# Patient Record
Sex: FEMALE | Race: ASIAN | NOT HISPANIC OR LATINO | Employment: FULL TIME | URBAN - METROPOLITAN AREA
[De-identification: names, ages, dates, MRNs, and addresses within clinical notes are randomized per-mention and may not be internally consistent; named-entity substitution may affect disease eponyms.]

---

## 2017-12-28 ENCOUNTER — OFFICE VISIT (OUTPATIENT)
Dept: FAMILY MEDICINE | Facility: CLINIC | Age: 32
End: 2017-12-28
Payer: COMMERCIAL

## 2017-12-28 VITALS
HEART RATE: 73 BPM | WEIGHT: 129 LBS | DIASTOLIC BLOOD PRESSURE: 58 MMHG | SYSTOLIC BLOOD PRESSURE: 90 MMHG | TEMPERATURE: 97.8 F | BODY MASS INDEX: 23.74 KG/M2 | HEIGHT: 62 IN

## 2017-12-28 DIAGNOSIS — Z00.00 ROUTINE ADULT HEALTH MAINTENANCE: Primary | ICD-10-CM

## 2017-12-28 DIAGNOSIS — Z13.21 ENCOUNTER FOR VITAMIN DEFICIENCY SCREENING: ICD-10-CM

## 2017-12-28 PROCEDURE — 36415 COLL VENOUS BLD VENIPUNCTURE: CPT | Performed by: INTERNAL MEDICINE

## 2017-12-28 PROCEDURE — 82306 VITAMIN D 25 HYDROXY: CPT | Performed by: INTERNAL MEDICINE

## 2017-12-28 PROCEDURE — 99385 PREV VISIT NEW AGE 18-39: CPT | Performed by: INTERNAL MEDICINE

## 2017-12-28 NOTE — NURSING NOTE
"Chief Complaint   Patient presents with     Physical     fasting       Initial BP 90/58 (BP Location: Right arm, Patient Position: Chair, Cuff Size: Adult Regular)  Pulse 73  Temp 97.8  F (36.6  C) (Tympanic)  Ht 5' 1.5\" (1.562 m)  Wt 129 lb (58.5 kg)  LMP 12/09/2017  BMI 23.98 kg/m2 Estimated body mass index is 23.98 kg/(m^2) as calculated from the following:    Height as of this encounter: 5' 1.5\" (1.562 m).    Weight as of this encounter: 129 lb (58.5 kg).  Medication Reconciliation: complete  "

## 2017-12-28 NOTE — MR AVS SNAPSHOT
After Visit Summary   12/28/2017    Shayla Coronel    MRN: [id number]           Patient Information     Date Of Birth          1985        Visit Information        Provider Department      12/28/2017 10:00 AM Chyna Fry MD [facility]        Today's Diagnoses     Encounter for vitamin deficiency screening    -  1    Screening for malignant neoplasm of cervix        Need for prophylactic vaccination and inoculation against influenza        Need for prophylactic vaccination with tetanus-diphtheria (TD)          Care Instructions      Preventive Health Recommendations  Female Ages 26 - 39  Yearly exam:   See your health care provider every year in order to    Review health changes.     Discuss preventive care.      Review your medicines if you your doctor has prescribed any.    Until age 30: Get a Pap test every three years (more often if you have had an abnormal result).    After age 30: Talk to your doctor about whether you should have a Pap test every 3 years or have a Pap test with HPV screening every 5 years.   You do not need a Pap test if your uterus was removed (hysterectomy) and you have not had cancer.  You should be tested each year for STDs (sexually transmitted diseases), if you're at risk.   Talk to your provider about how often to have your cholesterol checked.  If you are at risk for diabetes, you should have a diabetes test (fasting glucose).  Shots: Get a flu shot each year. Get a tetanus shot every 10 years.   Nutrition:     Eat at least 5 servings of fruits and vegetables each day.    Eat whole-grain bread, whole-wheat pasta and brown rice instead of white grains and rice.    Talk to your provider about Calcium and Vitamin D.     Lifestyle    Exercise at least 150 minutes a week (30 minutes a day, 5 days of the week). This will help you control your weight and prevent disease.    Limit alcohol to one drink per day.    No smoking.     Wear  "sunscreen to prevent skin cancer.    See your dentist every six months for an exam and cleaning.            Follow-ups after your visit        Follow-up notes from your care team     Return for soon for pap smear.      Who to contact     If you have questions or need follow up information about today's clinic visit or your schedule please contact [facility] [place] directly at [phone].  Normal or non-critical lab and imaging results will be communicated to you by MyChart, letter or phone within 4 business days after the clinic has received the results. If you do not hear from us within 7 days, please contact the clinic through [employer] or phone. If you have a critical or abnormal lab result, we will notify you by phone as soon as possible.  Submit refill requests through [employer] or call your pharmacy and they will forward the refill request to us. Please allow 3 business days for your refill to be completed.          Additional Information About Your Visit        [employer] Information     [employer] lets you send messages to your doctor, view your test results, renew your prescriptions, schedule appointments and more. To sign up, go to [email or web address] . Click on \"Log in\" on the left side of the screen, which will take you to the Welcome page. Then click on \"Sign up Now\" on the right side of the page.     You will be asked to enter the access code listed below, as well as some personal information. Please follow the directions to create your username and password.     Your access code is: [email or web address]  Expires: 3/28/2018 10:32 AM     Your access code will  in 90 days. If you need help or a new code, please call your [facility] clinic or [phone].        Care EveryWhere ID     This is your Care EveryWhere ID. This could be used by other organizations to access your [facility] medical records  [id number]        Your Vitals Were     Pulse Temperature Height Last Period BMI (Body Mass Index)    " "   73 97.8  F (36.6  C) (Tympanic) 5' 1.5\" (1.562 m) 12/09/2017 23.98 kg/m2        Blood Pressure from Last 3 Encounters:   12/28/17 90/58    Weight from Last 3 Encounters:   12/28/17 129 lb (58.5 kg)              We Performed the Following     Vitamin D Deficiency        Primary Care Provider Fax #    Provider Not In System [phone]                Equal Access to Services     YOSELIN STEELE : Hadii aad ku hadasho Soomaali, waaxda luqadaha, qaybta kaalmada adeegyada, waxay idiin hayaan adeeg ramonsh lalauro . So [facility] [phone].    ATENCIÓN: Si habla esptomás, tiene a floyd disposición servicios gratuitos de asistencia lingüística. Llame al [phone].    We comply with applicable federal civil rights laws and [place] laws. We do not discriminate on the basis of race, color, national origin, age, disability, sex, sexual orientation, or gender identity.            Thank you!     Thank you for choosing [facility]  for your care. Our goal is always to provide you with excellent care. Hearing back from our patients is one way we can continue to improve our services. Please take a few minutes to complete the written survey that you may receive in the mail after your visit with us. Thank you!             Your Updated Medication List - Protect others around you: Learn how to safely use, store and throw away your medicines at www.disposemymeds.org.      Notice  As of 12/28/2017 10:32 AM    You have not been prescribed any medications.      "

## 2017-12-28 NOTE — LETTER
December 29, 2017      Shayla De Jesus  [address]  [place] [address]        Dear ,    We are writing to inform you of your test results.    Your vitamin D level is indeed low.  Please start taking 1000 international units supplement of vitamin D daily.  We can repeat this test in 4-6 months.    Resulted Orders   Vitamin D Deficiency   Result Value Ref Range    Vitamin D Deficiency screening 14 (L) 20 - 75 ug/L      Comment:      Season, race, dietary intake, and treatment affect the concentration of   25-hydroxy-Vitamin D. Values may decrease during winter months and increase   during summer months. Values 20-29 ug/L may indicate Vitamin D insufficiency   and values <20 ug/L may indicate Vitamin D deficiency.  Vitamin D determination is routinely performed by an immunoassay specific for   25 hydroxyvitamin D3.  If an individual is on vitamin D2 (ergocalciferol)   supplementation, please specify 25 OH vitamin D2 and D3 level determination by   LCMSMS test [id number].         If you have any questions or concerns, please call the clinic at the number listed above.       Sincerely,        Chyna Fry MD

## 2017-12-28 NOTE — PROGRESS NOTES
SUBJECTIVE:   CC: Shayla Coronel is an 32 year old woman who presents for preventive health visit.     Shayla lives with her 1 year old and . Works as .     Healthy Habits:    Do you get at least three servings of calcium containing foods daily (dairy, green leafy vegetables, etc.)? yes    Amount of exercise or daily activities, outside of work: 0 day(s) per week    Problems taking medications regularly No    Medication side effects: No    Have you had an eye exam in the past two years? no    Do you see a dentist twice per year? yes    Do you have sleep apnea, excessive snoring or daytime drowsiness?no          Today's PHQ-2 Score:   PHQ-2 (  [employer]) 2017   Q1: Little interest or pleasure in doing things 0   Q2: Feeling down, depressed or hopeless 1   PHQ-2 Score 1         Abuse: Current or Past(Physical, Sexual or Emotional)- NO  Do you feel safe in your environment - YES  Social History   Substance Use Topics     Smoking status: Never Smoker     Smokeless tobacco: Never Used     Alcohol use No     If you drink alcohol do you typically have >3 drinks per day or >7 drinks per week? No                     Reviewed orders with patient.  Reviewed health maintenance and updated orders accordingly - Yes  There is no problem list on file for this patient.    Past Surgical History:   Procedure Laterality Date      SECTION         Social History   Substance Use Topics     Smoking status: Never Smoker     Smokeless tobacco: Never Used     Alcohol use No     Family History   Problem Relation Age of Onset     DIABETES Father          No current outpatient prescriptions on file.         Mammogram not appropriate for this patient based on age.    Pertinent mammograms are reviewed under the imaging tab.  History of abnormal Pap smear: NO - age 30- 65 PAP every 3 years recommended    Reviewed and updated as needed this visit by clinical staff  Tobacco  Allergies  Meds  Med Hx   "Surg Hx  Fam Hx  Soc Hx        Reviewed and updated as needed this visit by Provider  Tobacco  Allergies  Meds  Med Hx  Surg Hx  Fam Hx  Soc Hx             ROS:  C: NEGATIVE for fever, chills, change in weight  I: NEGATIVE for worrisome rashes, moles or lesions  E: NEGATIVE for vision changes or irritation  ENT: NEGATIVE for ear, mouth and throat problems  R: NEGATIVE for significant cough or SOB  B: NEGATIVE for masses, tenderness or discharge  CV: NEGATIVE for chest pain, palpitations or peripheral edema  GI: NEGATIVE for nausea, abdominal pain, heartburn, or change in bowel habits  : NEGATIVE for unusual urinary or vaginal symptoms. Periods are regular.  M: some soreness in left wrist - baby sleeps on that arm at night. NEGATIVE for significant arthralgias or myalgia  N: NEGATIVE for weakness, dizziness or paresthesias  P: NEGATIVE for changes in mood or affect    OBJECTIVE:   BP 90/58 (BP Location: Right arm, Patient Position: Chair, Cuff Size: Adult Regular)  Pulse 73  Temp 97.8  F (36.6  C) (Tympanic)  Ht 5' 1.5\" (1.562 m)  Wt 129 lb (58.5 kg)  LMP 12/09/2017  BMI 23.98 kg/m2  EXAM:  GENERAL: healthy, alert and no distress  EYES: Eyes grossly normal to inspection, PERRL and conjunctivae and sclerae normal  HENT: ear canals and TM's normal, mouth without ulcers or lesions  NECK: no adenopathy, no asymmetry, masses, or scars and thyroid normal to palpation  RESP: lungs clear to auscultation - no rales, rhonchi or wheezes  CV: regular rate and rhythm, normal S1 S2, no S3 or S4, no murmur, click or rub, no peripheral edema and peripheral pulses strong  ABDOMEN: soft, nontender, and bowel sounds normal  MS: no gross musculoskeletal defects noted, no edema  SKIN: no suspicious lesions or rashes  NEURO: Normal strength and tone, mentation intact and speech normal  PSYCH: mentation appears normal, affect normal/bright    ASSESSMENT/PLAN:   1. Routine adult health maintenance  Healthy 32 year old woman. " "  Declined pap smear today, states she will make another appt to have that done   No need for routine labs     2. Encounter for vitamin deficiency screening  Would like to check vitamin D, some of her friends are deficient   - Vitamin D Deficiency    COUNSELING:   Reviewed preventive health counseling, as reflected in patient instructions       Regular exercise       Healthy diet/nutrition       Contraception - declines need for hormonal contraception        Osteoporosis Prevention/Bone Health       Regular dental care         reports that she has never smoked. She has never used smokeless tobacco.    Estimated body mass index is 23.98 kg/(m^2) as calculated from the following:    Height as of this encounter: 5' 1.5\" (1.562 m).    Weight as of this encounter: 129 lb (58.5 kg).         Counseling Resources:  ATP IV Guidelines  Pooled Cohorts Equation Calculator  Breast Cancer Risk Calculator  FRAX Risk Assessment  ICSI Preventive Guidelines  Dietary Guidelines for Americans, 2010  USDA's MyPlate  ASA Prophylaxis  Lung CA Screening    Chyna Fry MD  [facility]  "

## 2017-12-29 LAB — DEPRECATED CALCIDIOL+CALCIFEROL SERPL-MC: 14 UG/L (ref 20–75)

## 2018-11-18 LAB — HIV 1+2 AB+HIV1 P24 AG SERPL QL IA: NORMAL

## 2019-04-19 LAB
ABO + RH BLD: NORMAL
ABO + RH BLD: NORMAL
BLD GP AB SCN SERPL QL: NORMAL
HBV SURFACE AG SERPL QL IA: NORMAL
RUBELLA ABY IGG: NORMAL

## 2019-07-23 LAB
C TRACH DNA SPEC QL PROBE+SIG AMP: NORMAL
N GONORRHOEA DNA SPEC QL PROBE+SIG AMP: NORMAL

## 2019-08-09 LAB — TREPONEMA ANTIBODIES: NORMAL

## 2019-10-01 LAB — GROUP B STREP PCR: NORMAL

## 2019-10-04 ENCOUNTER — HOSPITAL ENCOUNTER (INPATIENT)
Facility: CLINIC | Age: 34
LOS: 3 days | Discharge: HOME HEALTH | End: 2019-10-07
Attending: OBSTETRICS & GYNECOLOGY | Admitting: OBSTETRICS & GYNECOLOGY
Payer: COMMERCIAL

## 2019-10-04 PROBLEM — Z98.891 HISTORY OF CESAREAN SECTION: Status: ACTIVE | Noted: 2019-10-04

## 2019-10-04 PROBLEM — Z36.89 ENCOUNTER FOR TRIAGE IN PREGNANT PATIENT: Status: ACTIVE | Noted: 2019-10-04

## 2019-10-04 LAB
ABO + RH BLD: NORMAL
ABO + RH BLD: NORMAL
BASOPHILS # BLD AUTO: 0 10E9/L (ref 0–0.2)
BASOPHILS NFR BLD AUTO: 0.3 %
BLD GP AB SCN SERPL QL: NORMAL
BLOOD BANK CMNT PATIENT-IMP: NORMAL
DIFFERENTIAL METHOD BLD: ABNORMAL
EOSINOPHIL # BLD AUTO: 0.1 10E9/L (ref 0–0.7)
EOSINOPHIL NFR BLD AUTO: 1.4 %
ERYTHROCYTE [DISTWIDTH] IN BLOOD BY AUTOMATED COUNT: 14.2 % (ref 10–15)
HCT VFR BLD AUTO: 33.3 % (ref 35–47)
HGB BLD-MCNC: 11.2 G/DL (ref 11.7–15.7)
IMM GRANULOCYTES # BLD: 0.1 10E9/L (ref 0–0.4)
IMM GRANULOCYTES NFR BLD: 0.6 %
LYMPHOCYTES # BLD AUTO: 1.6 10E9/L (ref 0.8–5.3)
LYMPHOCYTES NFR BLD AUTO: 20.9 %
MCH RBC QN AUTO: 31.8 PG (ref 26.5–33)
MCHC RBC AUTO-ENTMCNC: 33.6 G/DL (ref 31.5–36.5)
MCV RBC AUTO: 95 FL (ref 78–100)
MONOCYTES # BLD AUTO: 0.5 10E9/L (ref 0–1.3)
MONOCYTES NFR BLD AUTO: 6.2 %
NEUTROPHILS # BLD AUTO: 5.5 10E9/L (ref 1.6–8.3)
NEUTROPHILS NFR BLD AUTO: 70.6 %
NRBC # BLD AUTO: 0 10*3/UL
NRBC BLD AUTO-RTO: 0 /100
PLATELET # BLD AUTO: 136 10E9/L (ref 150–450)
RBC # BLD AUTO: 3.52 10E12/L (ref 3.8–5.2)
RUPTURE OF FETAL MEMBRANES BY ROM PLUS: POSITIVE
SPECIMEN EXP DATE BLD: NORMAL
T PALLIDUM AB SER QL: NONREACTIVE
WBC # BLD AUTO: 7.7 10E9/L (ref 4–11)

## 2019-10-04 PROCEDURE — 25000128 H RX IP 250 OP 636

## 2019-10-04 PROCEDURE — 86900 BLOOD TYPING SEROLOGIC ABO: CPT | Performed by: OBSTETRICS & GYNECOLOGY

## 2019-10-04 PROCEDURE — 12000000 ZZH R&B MED SURG/OB

## 2019-10-04 PROCEDURE — 36415 COLL VENOUS BLD VENIPUNCTURE: CPT | Performed by: OBSTETRICS & GYNECOLOGY

## 2019-10-04 PROCEDURE — 96365 THER/PROPH/DIAG IV INF INIT: CPT

## 2019-10-04 PROCEDURE — 25000125 ZZHC RX 250: Performed by: OBSTETRICS & GYNECOLOGY

## 2019-10-04 PROCEDURE — 86780 TREPONEMA PALLIDUM: CPT | Performed by: OBSTETRICS & GYNECOLOGY

## 2019-10-04 PROCEDURE — 84112 EVAL AMNIOTIC FLUID PROTEIN: CPT | Performed by: OBSTETRICS & GYNECOLOGY

## 2019-10-04 PROCEDURE — 25800030 ZZH RX IP 258 OP 636: Performed by: OBSTETRICS & GYNECOLOGY

## 2019-10-04 PROCEDURE — 96372 THER/PROPH/DIAG INJ SC/IM: CPT

## 2019-10-04 PROCEDURE — 85025 COMPLETE CBC W/AUTO DIFF WBC: CPT | Performed by: OBSTETRICS & GYNECOLOGY

## 2019-10-04 PROCEDURE — G0463 HOSPITAL OUTPT CLINIC VISIT: HCPCS

## 2019-10-04 PROCEDURE — 86901 BLOOD TYPING SEROLOGIC RH(D): CPT | Performed by: OBSTETRICS & GYNECOLOGY

## 2019-10-04 PROCEDURE — 86850 RBC ANTIBODY SCREEN: CPT | Performed by: OBSTETRICS & GYNECOLOGY

## 2019-10-04 RX ORDER — OXYTOCIN 10 [USP'U]/ML
10 INJECTION, SOLUTION INTRAMUSCULAR; INTRAVENOUS
Status: DISCONTINUED | OUTPATIENT
Start: 2019-10-04 | End: 2019-10-07 | Stop reason: HOSPADM

## 2019-10-04 RX ORDER — CEFAZOLIN SODIUM 2 G/100ML
2 INJECTION, SOLUTION INTRAVENOUS
Status: COMPLETED | OUTPATIENT
Start: 2019-10-04 | End: 2019-10-05

## 2019-10-04 RX ORDER — NALOXONE HYDROCHLORIDE 0.4 MG/ML
.1-.4 INJECTION, SOLUTION INTRAMUSCULAR; INTRAVENOUS; SUBCUTANEOUS
Status: DISCONTINUED | OUTPATIENT
Start: 2019-10-04 | End: 2019-10-07 | Stop reason: HOSPADM

## 2019-10-04 RX ORDER — FERROUS SULFATE 325(65) MG
325 TABLET ORAL
COMMUNITY
End: 2021-11-09

## 2019-10-04 RX ORDER — OXYCODONE AND ACETAMINOPHEN 5; 325 MG/1; MG/1
1 TABLET ORAL
Status: DISCONTINUED | OUTPATIENT
Start: 2019-10-04 | End: 2019-10-07 | Stop reason: HOSPADM

## 2019-10-04 RX ORDER — AZITHROMYCIN 500 MG/1
500 INJECTION, POWDER, LYOPHILIZED, FOR SOLUTION INTRAVENOUS
Status: COMPLETED | OUTPATIENT
Start: 2019-10-04 | End: 2019-10-05

## 2019-10-04 RX ORDER — BETAMETHASONE SODIUM PHOSPHATE AND BETAMETHASONE ACETATE 3; 3 MG/ML; MG/ML
INJECTION, SUSPENSION INTRA-ARTICULAR; INTRALESIONAL; INTRAMUSCULAR; SOFT TISSUE
Status: COMPLETED
Start: 2019-10-04 | End: 2019-10-04

## 2019-10-04 RX ORDER — PRENATAL VIT/IRON FUM/FOLIC AC 27MG-0.8MG
1 TABLET ORAL DAILY
COMMUNITY
End: 2021-11-09

## 2019-10-04 RX ORDER — METHYLERGONOVINE MALEATE 0.2 MG/ML
200 INJECTION INTRAVENOUS
Status: COMPLETED | OUTPATIENT
Start: 2019-10-04 | End: 2019-10-05

## 2019-10-04 RX ORDER — ACETAMINOPHEN 325 MG/1
650 TABLET ORAL EVERY 4 HOURS PRN
Status: DISCONTINUED | OUTPATIENT
Start: 2019-10-04 | End: 2019-10-07 | Stop reason: HOSPADM

## 2019-10-04 RX ORDER — IBUPROFEN 400 MG/1
800 TABLET, FILM COATED ORAL
Status: COMPLETED | OUTPATIENT
Start: 2019-10-04 | End: 2019-10-06

## 2019-10-04 RX ORDER — CEFAZOLIN SODIUM 1 G/3ML
1 INJECTION, POWDER, FOR SOLUTION INTRAMUSCULAR; INTRAVENOUS SEE ADMIN INSTRUCTIONS
Status: DISCONTINUED | OUTPATIENT
Start: 2019-10-04 | End: 2019-10-05 | Stop reason: HOSPADM

## 2019-10-04 RX ORDER — SODIUM CHLORIDE, SODIUM LACTATE, POTASSIUM CHLORIDE, CALCIUM CHLORIDE 600; 310; 30; 20 MG/100ML; MG/100ML; MG/100ML; MG/100ML
INJECTION, SOLUTION INTRAVENOUS CONTINUOUS
Status: DISCONTINUED | OUTPATIENT
Start: 2019-10-04 | End: 2019-10-07 | Stop reason: HOSPADM

## 2019-10-04 RX ORDER — CARBOPROST TROMETHAMINE 250 UG/ML
250 INJECTION, SOLUTION INTRAMUSCULAR
Status: DISCONTINUED | OUTPATIENT
Start: 2019-10-04 | End: 2019-10-07 | Stop reason: HOSPADM

## 2019-10-04 RX ORDER — OXYTOCIN/0.9 % SODIUM CHLORIDE 30/500 ML
100-340 PLASTIC BAG, INJECTION (ML) INTRAVENOUS CONTINUOUS PRN
Status: DISCONTINUED | OUTPATIENT
Start: 2019-10-04 | End: 2019-10-07 | Stop reason: HOSPADM

## 2019-10-04 RX ORDER — CITRIC ACID/SODIUM CITRATE 334-500MG
30 SOLUTION, ORAL ORAL
Status: COMPLETED | OUTPATIENT
Start: 2019-10-04 | End: 2019-10-05

## 2019-10-04 RX ORDER — BETAMETHASONE SODIUM PHOSPHATE AND BETAMETHASONE ACETATE 3; 3 MG/ML; MG/ML
12 INJECTION, SUSPENSION INTRA-ARTICULAR; INTRALESIONAL; INTRAMUSCULAR; SOFT TISSUE ONCE
Status: COMPLETED | OUTPATIENT
Start: 2019-10-04 | End: 2019-10-04

## 2019-10-04 RX ORDER — ONDANSETRON 2 MG/ML
4 INJECTION INTRAMUSCULAR; INTRAVENOUS EVERY 6 HOURS PRN
Status: DISCONTINUED | OUTPATIENT
Start: 2019-10-04 | End: 2019-10-07 | Stop reason: HOSPADM

## 2019-10-04 RX ORDER — SODIUM CHLORIDE, SODIUM LACTATE, POTASSIUM CHLORIDE, CALCIUM CHLORIDE 600; 310; 30; 20 MG/100ML; MG/100ML; MG/100ML; MG/100ML
INJECTION, SOLUTION INTRAVENOUS CONTINUOUS
Status: DISCONTINUED | OUTPATIENT
Start: 2019-10-05 | End: 2019-10-05 | Stop reason: HOSPADM

## 2019-10-04 RX ORDER — OXYTOCIN/0.9 % SODIUM CHLORIDE 30/500 ML
1-24 PLASTIC BAG, INJECTION (ML) INTRAVENOUS CONTINUOUS
Status: DISCONTINUED | OUTPATIENT
Start: 2019-10-04 | End: 2019-10-07 | Stop reason: HOSPADM

## 2019-10-04 RX ORDER — LIDOCAINE 40 MG/G
CREAM TOPICAL
Status: DISCONTINUED | OUTPATIENT
Start: 2019-10-04 | End: 2019-10-07 | Stop reason: HOSPADM

## 2019-10-04 RX ADMIN — SODIUM CHLORIDE, POTASSIUM CHLORIDE, SODIUM LACTATE AND CALCIUM CHLORIDE: 600; 310; 30; 20 INJECTION, SOLUTION INTRAVENOUS at 23:06

## 2019-10-04 RX ADMIN — SODIUM CHLORIDE, POTASSIUM CHLORIDE, SODIUM LACTATE AND CALCIUM CHLORIDE 125 ML/HR: 600; 310; 30; 20 INJECTION, SOLUTION INTRAVENOUS at 08:31

## 2019-10-04 RX ADMIN — Medication 2 MILLI-UNITS/MIN: at 14:49

## 2019-10-04 RX ADMIN — BETAMETHASONE SODIUM PHOSPHATE AND BETAMETHASONE ACETATE 12 MG: 3; 3 INJECTION, SUSPENSION INTRA-ARTICULAR; INTRALESIONAL; INTRAMUSCULAR; SOFT TISSUE at 08:32

## 2019-10-04 RX ADMIN — SODIUM CHLORIDE, POTASSIUM CHLORIDE, SODIUM LACTATE AND CALCIUM CHLORIDE: 600; 310; 30; 20 INJECTION, SOLUTION INTRAVENOUS at 15:12

## 2019-10-04 RX ADMIN — BETAMETHASONE SODIUM PHOSPHATE AND BETAMETHASONE ACETATE 12 MG: 3; 3 INJECTION, SUSPENSION INTRA-ARTICULAR; INTRALESIONAL; INTRAMUSCULAR at 08:32

## 2019-10-04 ASSESSMENT — ACTIVITIES OF DAILY LIVING (ADL)
TOILETING: 0-->INDEPENDENT
BATHING: 0-->INDEPENDENT
FALL_HISTORY_WITHIN_LAST_SIX_MONTHS: NO
SWALLOWING: 0-->SWALLOWS FOODS/LIQUIDS WITHOUT DIFFICULTY
RETIRED_EATING: 0-->INDEPENDENT
DRESS: 0-->INDEPENDENT
COGNITION: 0 - NO COGNITION ISSUES REPORTED
AMBULATION: 0-->INDEPENDENT
TRANSFERRING: 0-->INDEPENDENT
RETIRED_COMMUNICATION: 0-->UNDERSTANDS/COMMUNICATES WITHOUT DIFFICULTY

## 2019-10-04 ASSESSMENT — MIFFLIN-ST. JEOR: SCORE: 1340.92

## 2019-10-04 NOTE — PLAN OF CARE
Cervix unchanged 3/90/-2, vega every 2-6 minutes but patient states that she still only feels tightening with the contractions, some bloody show noted on pad.  Dr Goodwin updated via phone and orders received to start pitocin.  Discussed with patient and patient agrees to proceed, verbal consent given.

## 2019-10-04 NOTE — PLAN OF CARE
Patient called regarding another blood clot that she passed in the toilet, about the size of a golf ball.  Small amount of bright red blood also noted on pad.  Dr Goodwin notified and will continue to monitor, ok to continue on pitocin.

## 2019-10-04 NOTE — PROVIDER NOTIFICATION
Per Dr Goodwin, pt is GBS negative. ML at clinic to send hard copy of this result as not in her prenatal records.

## 2019-10-04 NOTE — PLAN OF CARE
Patient states she noticed a slight  increase of bleeding and one dime sized clot when she went up to the bathroom. The bleeding was still brown in color. She states she has has no increase of pain, just the mild contractions at this time. Advised patient to not flush the toilet next time in order to better assess the clots and bleeding if there is an increase. Will continue to monitor closely.

## 2019-10-04 NOTE — PLAN OF CARE
Pt transferred to room 215 for observation. Ambulated independently. Pt oriented to unit, room, and call light system. External toco and US applied with pt's verbal consent. VSS on RA. Denies feeling ctx. Scant amount of clear fluid and bright blood noted on the pad. Bedrest with bathroom privileges at this time. Plan to monitor ctx pattern, bleeding and FHT and notify provider with concerns.

## 2019-10-04 NOTE — PLAN OF CARE
Patient states she is comfortable with cramping. Contractions are about every 2-10 min apart. FHT baseline of 130, moderate variability, accelerations present, and no decelerations at this time. Dr. Goodwin at the bedside at 1115 to evaluate the patient. Patient states she has felt only a scant amount of clear fluid since admission and bleeding is now brown in color and only a small amount on her pad. SVE per Dr. Goodwin 3/90/-2. Plan per Dr. Goodwin is to continue to let her labor without pitocin and reassess cervical progress in 2-3 hours. She is able to have an epidural when she is ready for one. Will continue to monitor closely.

## 2019-10-04 NOTE — H&P
OB ADMISSION NOTE    CHIEF COMPLAINT:  Leaking fluid     Principal Problem:     premature rupture of membranes in third trimester  Active Problems:    Indication for care in labor or delivery    History of  section      OBSTETRICAL / DATING HISTORY:  Estimated Date of Delivery: Oct 29, 2019  Gestational Age:  36w3d    OB History    Para Term  AB Living   2 1 1 0 0 1   SAB TAB Ectopic Multiple Live Births   0 0 0 0 1      # Outcome Date GA Lbr [place]/2nd Weight Sex Delivery Anes PTL Lv   2 Current            1 Term 16    F -SEC   DYUEN      Complications: Face presentation of fetus           PAST MEDICAL HISTORY:  History reviewed. No pertinent past medical history.     PAST SURGICAL HISTORY:  Past Surgical History:   Procedure Laterality Date     APPENDECTOMY        SECTION          FAMILY HISTORY:  Family History   Problem Relation Age of Onset     Diabetes Father          ALLERGIES:   No Known Allergies     HABITS:  Social History     Socioeconomic History     Marital status:      Spouse name: Not on file     Number of children: Not on file     Years of education: Not on file     Highest education level: Not on file   Occupational History     Not on file   Social Needs     Financial resource strain: Not on file     Food insecurity:     Worry: Not on file     Inability: Not on file     Transportation needs:     Medical: Not on file     Non-medical: Not on file   Tobacco Use     Smoking status: Never Smoker     Smokeless tobacco: Never Used   Substance and Sexual Activity     Alcohol use: No     Drug use: No     Sexual activity: Yes     Partners: Male     Birth control/protection: None   Lifestyle     Physical activity:     Days per week: Not on file     Minutes per session: Not on file     Stress: Not on file   Relationships     Social connections:     Talks on phone: Not on file     Gets together: Not on file     Attends Anabaptist service: Not on file     Active  "member of club or organization: Not on file     Attends meetings of clubs or organizations: Not on file     Relationship status: Not on file     Intimate partner violence:     Fear of current or ex partner: Not on file     Emotionally abused: Not on file     Physically abused: Not on file     Forced sexual activity: Not on file   Other Topics Concern     Parent/sibling w/ CABG, MI or angioplasty before 65F 55M? Not Asked   Social History Narrative     Not on file        HISTORY OF PRESENT ILLNESS:    (Please see scanned  sheets for prenatal history. Examination at the time of admission revealed no interval change in the patient s history or physical exam except as described below.    34 year old  at 36w3d who presents after PPROM to clear fluid and then bright red. Bleeding has resolved and now just a little brown discharge. Feeling contractions, not very painful yet.  Reviewed history of prior  for face presentation/mentum posterior.      REVIEW OF SYSTEMS:  Negative except per HPI    PHYSICAL EXAM:   Patient Vitals for the past 8 hrs:   BP Temp Temp src Pulse Heart Rate Resp Height Weight   10/04/19 1000 -- 98.1  F (36.7  C) Temporal -- -- 16 -- --   10/04/19 0905 100/59 98  F (36.7  C) Temporal -- 75 16 -- --   10/04/19 0745 -- 99.3  F (37.4  C) Temporal -- -- -- -- --   10/04/19 0734 100/57 98.6  F (37  C) Temporal 82 -- 16 1.549 m (5' 1\") 69.9 kg (154 lb)     Gen: NAD, lying in bed  CV/Pulm: unlabored breathing  Abd: gravid, non-tender    SVE: 3/90/-2  Membrane Status: ruptured  Fetal Presentation: vertex  EFW: AGA    EFM & [place]: category 1, contractions irregular    Recent Labs   Lab Test 10/04/19  0840   HGB 11.2*   *         Assessment/Plan: 34 year old  at 36w3d here after PPROM in early labor  -admitted to L&D  -TOLAC: reviewed in detail with Dr. Sosa in office, no questions  PPROM, in early labor, plan recheck cervix in 2-3 hours, if no change low dose pitocin  S/p " betamethasone on admit  T&S done  -GBS negative  Dispo: postpartum    Anika Goodwin MD  10/4/2019   Pager: [phone]

## 2019-10-05 ENCOUNTER — ANESTHESIA (OUTPATIENT)
Dept: OBGYN | Facility: CLINIC | Age: 34
End: 2019-10-05
Payer: COMMERCIAL

## 2019-10-05 ENCOUNTER — ANESTHESIA EVENT (OUTPATIENT)
Dept: OBGYN | Facility: CLINIC | Age: 34
End: 2019-10-05
Payer: COMMERCIAL

## 2019-10-05 LAB
AMPHETAMINES UR QL SCN: NEGATIVE
CANNABINOIDS UR QL: NEGATIVE
COCAINE UR QL: NEGATIVE
OPIATES UR QL SCN: NEGATIVE
PCP UR QL SCN: NEGATIVE

## 2019-10-05 PROCEDURE — 12000035 ZZH R&B POSTPARTUM

## 2019-10-05 PROCEDURE — 25800030 ZZH RX IP 258 OP 636: Performed by: NURSE ANESTHETIST, CERTIFIED REGISTERED

## 2019-10-05 PROCEDURE — 36000058 ZZH SURGERY LEVEL 3 EA 15 ADDTL MIN: Performed by: OBSTETRICS & GYNECOLOGY

## 2019-10-05 PROCEDURE — 37000008 ZZH ANESTHESIA TECHNICAL FEE, 1ST 30 MIN: Performed by: OBSTETRICS & GYNECOLOGY

## 2019-10-05 PROCEDURE — 25000125 ZZHC RX 250: Performed by: NURSE ANESTHETIST, CERTIFIED REGISTERED

## 2019-10-05 PROCEDURE — 25800030 ZZH RX IP 258 OP 636: Performed by: OBSTETRICS & GYNECOLOGY

## 2019-10-05 PROCEDURE — 88307 TISSUE EXAM BY PATHOLOGIST: CPT | Performed by: OBSTETRICS & GYNECOLOGY

## 2019-10-05 PROCEDURE — 80307 DRUG TEST PRSMV CHEM ANLYZR: CPT | Performed by: OBSTETRICS & GYNECOLOGY

## 2019-10-05 PROCEDURE — 37000009 ZZH ANESTHESIA TECHNICAL FEE, EACH ADDTL 15 MIN: Performed by: OBSTETRICS & GYNECOLOGY

## 2019-10-05 PROCEDURE — 27210794 ZZH OR GENERAL SUPPLY STERILE: Performed by: OBSTETRICS & GYNECOLOGY

## 2019-10-05 PROCEDURE — 25000128 H RX IP 250 OP 636: Performed by: OBSTETRICS & GYNECOLOGY

## 2019-10-05 PROCEDURE — 25000125 ZZHC RX 250: Performed by: OBSTETRICS & GYNECOLOGY

## 2019-10-05 PROCEDURE — 36000056 ZZH SURGERY LEVEL 3 1ST 30 MIN: Performed by: OBSTETRICS & GYNECOLOGY

## 2019-10-05 PROCEDURE — 71000014 ZZH RECOVERY PHASE 1 LEVEL 2 FIRST HR: Performed by: OBSTETRICS & GYNECOLOGY

## 2019-10-05 PROCEDURE — 25000132 ZZH RX MED GY IP 250 OP 250 PS 637: Performed by: OBSTETRICS & GYNECOLOGY

## 2019-10-05 PROCEDURE — 25000128 H RX IP 250 OP 636: Performed by: NURSE ANESTHETIST, CERTIFIED REGISTERED

## 2019-10-05 PROCEDURE — 88307 TISSUE EXAM BY PATHOLOGIST: CPT | Mod: 26 | Performed by: OBSTETRICS & GYNECOLOGY

## 2019-10-05 RX ORDER — DEXTROSE, SODIUM CHLORIDE, SODIUM LACTATE, POTASSIUM CHLORIDE, AND CALCIUM CHLORIDE 5; .6; .31; .03; .02 G/100ML; G/100ML; G/100ML; G/100ML; G/100ML
INJECTION, SOLUTION INTRAVENOUS CONTINUOUS
Status: DISCONTINUED | OUTPATIENT
Start: 2019-10-05 | End: 2019-10-07 | Stop reason: HOSPADM

## 2019-10-05 RX ORDER — AMOXICILLIN 250 MG
1 CAPSULE ORAL 2 TIMES DAILY PRN
Status: DISCONTINUED | OUTPATIENT
Start: 2019-10-05 | End: 2019-10-07 | Stop reason: HOSPADM

## 2019-10-05 RX ORDER — HYDROMORPHONE HYDROCHLORIDE 1 MG/ML
.3-.5 INJECTION, SOLUTION INTRAMUSCULAR; INTRAVENOUS; SUBCUTANEOUS EVERY 30 MIN PRN
Status: DISCONTINUED | OUTPATIENT
Start: 2019-10-05 | End: 2019-10-07 | Stop reason: HOSPADM

## 2019-10-05 RX ORDER — AMOXICILLIN 250 MG
2 CAPSULE ORAL 2 TIMES DAILY PRN
Status: DISCONTINUED | OUTPATIENT
Start: 2019-10-05 | End: 2019-10-07 | Stop reason: HOSPADM

## 2019-10-05 RX ORDER — OXYCODONE HYDROCHLORIDE 5 MG/1
5-10 TABLET ORAL EVERY 4 HOURS PRN
Status: DISCONTINUED | OUTPATIENT
Start: 2019-10-05 | End: 2019-10-07 | Stop reason: HOSPADM

## 2019-10-05 RX ORDER — SCOLOPAMINE TRANSDERMAL SYSTEM 1 MG/1
1 PATCH, EXTENDED RELEASE TRANSDERMAL ONCE
Status: DISCONTINUED | OUTPATIENT
Start: 2019-10-05 | End: 2019-10-07 | Stop reason: HOSPADM

## 2019-10-05 RX ORDER — NALBUPHINE HYDROCHLORIDE 10 MG/ML
2.5-5 INJECTION, SOLUTION INTRAMUSCULAR; INTRAVENOUS; SUBCUTANEOUS EVERY 6 HOURS PRN
Status: DISCONTINUED | OUTPATIENT
Start: 2019-10-05 | End: 2019-10-07 | Stop reason: HOSPADM

## 2019-10-05 RX ORDER — ONDANSETRON 2 MG/ML
4 INJECTION INTRAMUSCULAR; INTRAVENOUS EVERY 6 HOURS PRN
Status: DISCONTINUED | OUTPATIENT
Start: 2019-10-05 | End: 2019-10-07 | Stop reason: HOSPADM

## 2019-10-05 RX ORDER — HYDROCORTISONE 2.5 %
CREAM (GRAM) TOPICAL 3 TIMES DAILY PRN
Status: DISCONTINUED | OUTPATIENT
Start: 2019-10-05 | End: 2019-10-07 | Stop reason: HOSPADM

## 2019-10-05 RX ORDER — BUPIVACAINE HYDROCHLORIDE 7.5 MG/ML
INJECTION, SOLUTION INTRASPINAL PRN
Status: DISCONTINUED | OUTPATIENT
Start: 2019-10-05 | End: 2019-10-05

## 2019-10-05 RX ORDER — OXYTOCIN/0.9 % SODIUM CHLORIDE 30/500 ML
PLASTIC BAG, INJECTION (ML) INTRAVENOUS CONTINUOUS PRN
Status: DISCONTINUED | OUTPATIENT
Start: 2019-10-05 | End: 2019-10-05

## 2019-10-05 RX ORDER — ONDANSETRON 2 MG/ML
INJECTION INTRAMUSCULAR; INTRAVENOUS PRN
Status: DISCONTINUED | OUTPATIENT
Start: 2019-10-05 | End: 2019-10-05

## 2019-10-05 RX ORDER — OXYTOCIN/0.9 % SODIUM CHLORIDE 30/500 ML
100 PLASTIC BAG, INJECTION (ML) INTRAVENOUS CONTINUOUS
Status: DISCONTINUED | OUTPATIENT
Start: 2019-10-05 | End: 2019-10-07 | Stop reason: HOSPADM

## 2019-10-05 RX ORDER — NALOXONE HYDROCHLORIDE 0.4 MG/ML
.1-.4 INJECTION, SOLUTION INTRAMUSCULAR; INTRAVENOUS; SUBCUTANEOUS
Status: DISCONTINUED | OUTPATIENT
Start: 2019-10-05 | End: 2019-10-07 | Stop reason: HOSPADM

## 2019-10-05 RX ORDER — IBUPROFEN 600 MG/1
600 TABLET, FILM COATED ORAL EVERY 6 HOURS PRN
Status: DISCONTINUED | OUTPATIENT
Start: 2019-10-05 | End: 2019-10-07 | Stop reason: HOSPADM

## 2019-10-05 RX ORDER — KETOROLAC TROMETHAMINE 30 MG/ML
30 INJECTION, SOLUTION INTRAMUSCULAR; INTRAVENOUS EVERY 6 HOURS
Status: COMPLETED | OUTPATIENT
Start: 2019-10-05 | End: 2019-10-05

## 2019-10-05 RX ORDER — MORPHINE SULFATE 1 MG/ML
INJECTION, SOLUTION EPIDURAL; INTRATHECAL; INTRAVENOUS PRN
Status: DISCONTINUED | OUTPATIENT
Start: 2019-10-05 | End: 2019-10-05

## 2019-10-05 RX ORDER — MORPHINE SULFATE 1 MG/ML
INJECTION, SOLUTION EPIDURAL; INTRATHECAL; INTRAVENOUS
Status: COMPLETED
Start: 2019-10-05 | End: 2019-10-05

## 2019-10-05 RX ORDER — ACETAMINOPHEN 325 MG/1
975 TABLET ORAL EVERY 6 HOURS PRN
Status: DISCONTINUED | OUTPATIENT
Start: 2019-10-05 | End: 2019-10-07 | Stop reason: HOSPADM

## 2019-10-05 RX ORDER — OXYTOCIN 10 [USP'U]/ML
10 INJECTION, SOLUTION INTRAMUSCULAR; INTRAVENOUS
Status: DISCONTINUED | OUTPATIENT
Start: 2019-10-05 | End: 2019-10-07 | Stop reason: HOSPADM

## 2019-10-05 RX ORDER — ONDANSETRON 2 MG/ML
4 INJECTION INTRAMUSCULAR; INTRAVENOUS EVERY 4 HOURS PRN
Status: DISCONTINUED | OUTPATIENT
Start: 2019-10-05 | End: 2019-10-07 | Stop reason: HOSPADM

## 2019-10-05 RX ORDER — SIMETHICONE 80 MG
80 TABLET,CHEWABLE ORAL 4 TIMES DAILY PRN
Status: DISCONTINUED | OUTPATIENT
Start: 2019-10-05 | End: 2019-10-07 | Stop reason: HOSPADM

## 2019-10-05 RX ORDER — LIDOCAINE 40 MG/G
CREAM TOPICAL
Status: DISCONTINUED | OUTPATIENT
Start: 2019-10-05 | End: 2019-10-07 | Stop reason: HOSPADM

## 2019-10-05 RX ORDER — LANOLIN 100 %
OINTMENT (GRAM) TOPICAL
Status: DISCONTINUED | OUTPATIENT
Start: 2019-10-05 | End: 2019-10-07 | Stop reason: HOSPADM

## 2019-10-05 RX ORDER — BISACODYL 10 MG
10 SUPPOSITORY, RECTAL RECTAL DAILY PRN
Status: DISCONTINUED | OUTPATIENT
Start: 2019-10-07 | End: 2019-10-07 | Stop reason: HOSPADM

## 2019-10-05 RX ORDER — OXYTOCIN/0.9 % SODIUM CHLORIDE 30/500 ML
340 PLASTIC BAG, INJECTION (ML) INTRAVENOUS CONTINUOUS PRN
Status: DISCONTINUED | OUTPATIENT
Start: 2019-10-05 | End: 2019-10-07 | Stop reason: HOSPADM

## 2019-10-05 RX ADMIN — Medication 100 ML/HR: at 05:00

## 2019-10-05 RX ADMIN — ACETAMINOPHEN 975 MG: 325 TABLET, FILM COATED ORAL at 21:04

## 2019-10-05 RX ADMIN — KETOROLAC TROMETHAMINE 30 MG: 30 INJECTION, SOLUTION INTRAMUSCULAR at 05:01

## 2019-10-05 RX ADMIN — SODIUM CITRATE AND CITRIC ACID MONOHYDRATE 30 ML: 500; 334 SOLUTION ORAL at 00:29

## 2019-10-05 RX ADMIN — SENNOSIDES AND DOCUSATE SODIUM 1 TABLET: 8.6; 5 TABLET ORAL at 21:04

## 2019-10-05 RX ADMIN — AZITHROMYCIN MONOHYDRATE 500 MG: 500 INJECTION, POWDER, LYOPHILIZED, FOR SOLUTION INTRAVENOUS at 00:45

## 2019-10-05 RX ADMIN — ACETAMINOPHEN 975 MG: 325 TABLET, FILM COATED ORAL at 05:02

## 2019-10-05 RX ADMIN — KETOROLAC TROMETHAMINE 30 MG: 30 INJECTION, SOLUTION INTRAMUSCULAR at 22:59

## 2019-10-05 RX ADMIN — SENNOSIDES AND DOCUSATE SODIUM 2 TABLET: 8.6; 5 TABLET ORAL at 08:06

## 2019-10-05 RX ADMIN — MORPHINE SULFATE 0.15 MG: 1 INJECTION, SOLUTION EPIDURAL; INTRATHECAL; INTRAVENOUS at 00:40

## 2019-10-05 RX ADMIN — KETOROLAC TROMETHAMINE 30 MG: 30 INJECTION, SOLUTION INTRAMUSCULAR at 11:02

## 2019-10-05 RX ADMIN — ACETAMINOPHEN 975 MG: 325 TABLET, FILM COATED ORAL at 13:17

## 2019-10-05 RX ADMIN — CEFAZOLIN SODIUM 2 G: 2 INJECTION, SOLUTION INTRAVENOUS at 00:40

## 2019-10-05 RX ADMIN — BUPIVACAINE HYDROCHLORIDE IN DEXTROSE 1.6 MG: 7.5 INJECTION, SOLUTION SUBARACHNOID at 00:40

## 2019-10-05 RX ADMIN — Medication 340 ML/HR: at 00:58

## 2019-10-05 RX ADMIN — SODIUM CHLORIDE, POTASSIUM CHLORIDE, SODIUM LACTATE AND CALCIUM CHLORIDE: 600; 310; 30; 20 INJECTION, SOLUTION INTRAVENOUS at 03:44

## 2019-10-05 RX ADMIN — METHYLERGONOVINE MALEATE 200 MCG: 0.2 INJECTION INTRAMUSCULAR; INTRAVENOUS at 01:08

## 2019-10-05 RX ADMIN — PHENYLEPHRINE HYDROCHLORIDE 0.5 MCG/KG/MIN: 10 INJECTION INTRAVENOUS at 00:41

## 2019-10-05 RX ADMIN — SODIUM CHLORIDE, SODIUM LACTATE, POTASSIUM CHLORIDE, CALCIUM CHLORIDE AND DEXTROSE MONOHYDRATE: 5; 600; 310; 30; 20 INJECTION, SOLUTION INTRAVENOUS at 11:06

## 2019-10-05 RX ADMIN — KETOROLAC TROMETHAMINE 30 MG: 30 INJECTION, SOLUTION INTRAMUSCULAR at 17:44

## 2019-10-05 RX ADMIN — ONDANSETRON 4 MG: 2 INJECTION INTRAMUSCULAR; INTRAVENOUS at 01:02

## 2019-10-05 RX ADMIN — SODIUM CHLORIDE, POTASSIUM CHLORIDE, SODIUM LACTATE AND CALCIUM CHLORIDE: 600; 310; 30; 20 INJECTION, SOLUTION INTRAVENOUS at 00:34

## 2019-10-05 NOTE — ANESTHESIA PREPROCEDURE EVALUATION
"Anesthesia Pre-Procedure Evaluation    Patient: Shayla De Jesus   MRN: [id number] : 1985          Preoperative Diagnosis: * No pre-op diagnosis entered *    Procedure(s):   SECTION    History reviewed. No pertinent past medical history.  Past Surgical History:   Procedure Laterality Date     APPENDECTOMY        SECTION         Anesthesia Evaluation     .             ROS/MED HX    ENT/Pulmonary:       Neurologic:       Cardiovascular:         METS/Exercise Tolerance:     Hematologic:         Musculoskeletal:         GI/Hepatic:         Renal/Genitourinary:         Endo:         Psychiatric:         Infectious Disease:         Malignancy:         Other:    (+) Possibly pregnant                         Physical Exam  Normal systems: dental    Airway   Mallampati: III  TM distance: >3 FB  Neck ROM: full    Dental     Cardiovascular   Rhythm and rate: regular      Pulmonary    breath sounds clear to auscultation            Lab Results   Component Value Date    WBC 7.7 10/04/2019    HGB 11.2 (L) 10/04/2019    HCT 33.3 (L) 10/04/2019     (L) 10/04/2019       Preop Vitals  BP Readings from Last 3 Encounters:   10/04/19 110/53   17 90/58    Pulse Readings from Last 3 Encounters:   10/04/19 82   17 73      Resp Readings from Last 3 Encounters:   10/04/19 16    SpO2 Readings from Last 3 Encounters:   No data found for SpO2      Temp Readings from Last 1 Encounters:   10/04/19 36.7  C (98.1  F) (Temporal)    Ht Readings from Last 1 Encounters:   10/04/19 1.549 m (5' 1\")      Wt Readings from Last 1 Encounters:   10/04/19 69.9 kg (154 lb)    Estimated body mass index is 29.1 kg/m  as calculated from the following:    Height as of this encounter: 1.549 m (5' 1\").    Weight as of this encounter: 69.9 kg (154 lb).       Anesthesia Plan      History & Physical Review  History and physical reviewed and following examination; no interval change.    ASA Status:  2 emergent.        Plan for " Spinal   PONV prophylaxis:  Ondansetron (or other 5HT-3)       Postoperative Care      Consents  Anesthetic plan, risks, benefits and alternatives discussed with:  Patient..                 Padmini Cosby

## 2019-10-05 NOTE — PROGRESS NOTES
[facility]   Obstetrics Post-Op / Progress Note         Assessment and Plan:    Assessment:   POD 0  Low transverse repeat  section  Failed TOLAC  L&D complications: Failed TOLAC      Doing well.  Clean wound without signs of infection.  Normal healing wound.  No immediate surgical complications identified.  No excessive bleeding  Pain well-controlled.      Plan:   Ambulation encouraged  Anticipate discharge in 3 days  ADAT            Interval History:   Doing well.  Pain is well-controlled.  No fevers.  No history of wound drainage, warmth or significant erythema.  Good appetite.  Denies chest pain, shortness of breath, nausea or vomiting.  Ambulatory.            Significant Problems:    History reviewed. No pertinent past medical history.          Review of Systems:    The patient denies any chest pain, shortness of breath, excessive pain, fever, chills, purulent drainage from the wound, nausea or vomiting.          Medications:   All medications related to the patient's surgery have been reviewed          Physical Exam:     All vitals stable  Patient Vitals for the past 8 hrs:   BP Temp Temp src Pulse Heart Rate Resp SpO2   10/05/19 0900 104/64 -- -- -- 56 16 100 %   10/05/19 0845 90/53 97.6  F (36.4  C) Oral -- 56 16 --   10/05/19 0659 95/57 -- -- -- 63 16 100 %   10/05/19 0559 (!) 88/52 -- -- -- 76 16 100 %   10/05/19 0500 97/58 -- -- -- 83 16 100 %   10/05/19 0430 90/48 98  F (36.7  C) Oral -- 81 16 100 %   10/05/19 0400 92/65 -- -- 80 79 13 95 %   10/05/19 0345 (!) 105/24 -- -- 98 72 13 99 %   10/05/19 0330 92/52 -- -- 71 77 17 96 %   10/05/19 0329 -- -- -- -- -- -- 95 %   10/05/19 0315 92/56 -- -- 76 76 23 97 %   10/05/19 0300 98/61 -- -- 69 72 13 --   10/05/19 0245 94/53 -- -- 74 73 14 96 %   10/05/19 0230 96/61 -- -- 72 75 13 96 %   10/05/19 0215 93/60 -- -- -- 78 15 95 %   10/05/19 0214 -- -- -- -- 75 18 94 %   10/05/19 0210 107/45 -- -- -- 74 16 --   10/05/19 0200 95/73 -- -- --  81 -- --     Wound clean and dry with minimal or no drainage.  Surrounding skin with minimal erythema.  Ext NT           Data:     All laboratory data related to this surgery reviewed  Hemoglobin   Date Value Ref Range Status   10/04/2019 11.2 (L) 11.7 - 15.7 g/dL Final     No imaging studies have been ordered    Crystal Dent MD

## 2019-10-05 NOTE — PROGRESS NOTES
Pt transferred in stable condition via bed with all belongings, [facility] to [facility].  SO accompanied pt to bedside. IV infusing. Report given to EJFF Pendleton.

## 2019-10-05 NOTE — PLAN OF CARE
VSS. Fundus firm, scant flow. Incision dressing CDI. Able to stand at the side of bed, xavier care completed. Ryan catheter draining clear urine. Urine tox neg. Breastfeeding infant every 3 hours, infant sleepy at breast. Pumping after breastfeeding. Supplementing infant with HDM/EMB. Rating pain a 2-3/10. Taking tylenol and ibuprofen for pain. Encouraged to call with questions and concerns. Will continue to monitor.

## 2019-10-05 NOTE — ANESTHESIA CARE TRANSFER NOTE
Patient: Shayla De Jesus    Procedure(s):   SECTION    Diagnosis: * No pre-op diagnosis entered *  Diagnosis Additional Information: No value filed.    Anesthesia Type:   Spinal     Note:  Airway :Room Air  Patient transferred to:Labor and Delivery  Comments:   Transferred to PACU RN. Patient awake and verbal. Spontaneous resp and on room air. Monitors and alarms on. VSS. Report given.      Vitals: (Last set prior to Anesthesia Care Transfer)    CRNA VITALS  10/5/2019 0102 - 10/5/2019 0139      10/5/2019             NIBP:  108/71    NIBP Mean:  86    Resp Rate (set):  10                Electronically Signed By: CARLOS Abbott CRNA  2019  1:39 AM

## 2019-10-05 NOTE — PLAN OF CARE
Pt brought to floor by RN with infant in her arms and  by her side. Report received in room 414. Pt orientated to room. Infant safety and bulb syringe addressed. Bands checked. All questions answered and encouraged. VSS. Fundus firm, down 1. Light flow. Golf ball size clot at first half an hour check. No further bleeding. No clots at seconded half an hour check. Denying any pain, given tylenol and toradol. Incision dressing CDI. Lungs clear. Hypoactive bowl sounds, tolerating clear liquids. Attempting to breastfeed infant every 3 hours with nipple shield. Will continue to monitor.

## 2019-10-05 NOTE — ANESTHESIA PROCEDURE NOTES
Peripheral nerve/Neuraxial procedure note : intrathecal  Pre-Procedure  Performed by Padmini Cosby  Location: OR      Pre-Anesthestic Checklist: patient identified, IV checked, site marked, risks and benefits discussed, informed consent, monitors and equipment checked, pre-op evaluation and at physician/surgeon's request    Timeout  Correct Patient: Yes   Correct Procedure: Yes   Correct Site: Yes   Correct Laterality: N/A   Correct Position: Yes   Site Marked: N/A   .   Procedure Documentation    .    Procedure: intrathecal, .   Patient Position:sitting Insertion Site:L4-5  (midline approach)     Patient Prep/Sterile Barriers; povidone-iodine 7.5% surgical scrub.  .  Needle:  Spinal Needle (gauge): 25  Spinal/LP Needle Length (inches): 3.5 # of attempts: 1 and # of redirects:  Introducer used .        Assessment/Narrative  Paresthesias: No.  .  .  clear CSF fluid removed .

## 2019-10-05 NOTE — PLAN OF CARE
PT progressing with plan of care.  Recovering from procedure, denies pain, vital signs WDL, adequate output.

## 2019-10-05 NOTE — ANESTHESIA POSTPROCEDURE EVALUATION
Patient: Shayla De Jesus    Procedure(s):   SECTION    Diagnosis:* No pre-op diagnosis entered *  Diagnosis Additional Information: No value filed.    Anesthesia Type:  Spinal    Note:  Anesthesia Post Evaluation    Patient location during evaluation: PACU  Patient participation: Able to participate in evaluation but full recovery from regional anesthesia has not yet ocurrred but is anticipated to occur within 48 hours  Level of consciousness: awake  Pain management: adequate  Airway patency: patent  Cardiovascular status: acceptable  Respiratory status: acceptable  Hydration status: acceptable  PONV: none     Anesthetic complications: None          Last vitals:  Vitals:    10/05/19 0430 10/05/19 0500 10/05/19 0559   BP: 90/48 97/58 (!) (P) 88/52   Pulse:      Resp: 16 16 (P) 16   Temp: 36.7  C (98  F)     SpO2: 100% 100% (P) 100%         Electronically Signed By: Padmini Cosby  2019  6:33 AM

## 2019-10-05 NOTE — L&D DELIVERY NOTE
"Delivery Summary    Shayla De Jesus MRN# [id number]   Age: 34 year old YOB: 1985     ASSESSMENT & PLAN:   34 year old  at 36w4d who presented after PPROM in early labor. Desired TOLAC (history of 2 prior low transverse  section for face presentation, mentum posterior). She progressed from 1-2 to 3-4 and then had no further progress despite 9+ hours of pitocin. Decision made to proceed with repeat  section.    She had an uncomplicated repeat low transverse  delivery of a female infant weighing 2.505 kg (5lbs 8.4oz). Apgars were 8 and 9 at 1 and 5 minutes respectively. See operative note for full details.     Anika Goodwin MD, MD  10/5/2019          Labor Events     steroids:  Partial Course  Labor Type:  Spontaneous  Predominate monitoring during 1st stage:  continuous electronic fetal monitoring     Antibiotics received during labor?:  No     Rupture date/time: 10/4/19 0230   Rupture type:  Spontaneous rupture of membranes prior to induction  Fluid color:  Clear, Bloody  Fluid odor:  Normal     1:1 continuous labor support provided by?:  RN       Delivery/Placenta Date and Time    Delivery Date:  10/5/19 Delivery Time:  12:57 AM   Placenta Date/Time:  10/5/2019 12:57 AM     Vaginal Counts          Needles Suture [place] Sponges Instruments   Initial counts       Added to count       Final counts       Placed during labor Accounted for at the end of labor   NA NA   NA NA   NA NA               Apgars    Living status:  Living   1 Minute 5 Minute 10 Minute 15 Minute 20 Minute   Skin color: 0  1       Heart rate: 2  2       Reflex irritability: 2  2       Muscle tone: 2  2       Respiratory effort: 2  2       Total: 8  9       Apgars assigned by:  JOANNE KUHN RN     Cord    Vessels:  3 Vessels    Cord Blood Disposition:  Lab Gases Sent?:  No      [place] Measurements    Weight:  5 lb 8.4 oz Length:  1' 6\"   Head circumference:  33 cm       Skin to Skin " and Feeding Plan    Skin to skin initiation date/time:     Skin to skin end date/time:     How do you plan to feed your baby:  Breastfeeding     Labor Events and Shoulder Dystocia    Fetal Tracing Prior to Delivery:  Category 1             Delivery (Maternal) (Provider to Complete) ([id number])       Blood Loss  Mother: Shayla De Jesus #[id number]   Start of Mother's Information    IO Blood Loss  10/05/19 0051 - 10/05/19 0135    Total Surgical QBL Blood Loss (mL) Hospital Encounter 480 mL    Total  480 mL         End of Mother's Information  Mother: Shayla De Jesus #[id number]         Delivery - Provider to Complete ([id number])    Delivering clinician:  Anika Goodwin MD  Delivery Type (Choose the 1 that will go to the Birth History):  , Low Transverse   Specifics:  Repeat  Indications for Repeat:  Failed TOLAC          Placenta    Delayed Cord Clamping:  Done  Date/Time:  10/5/2019 12:57 AM  Removal:  Manual Removal  Disposition:  Pathology     Anesthesia    Method:  Spinal          Presentation and Position    Presentation:  Vertex   Occiput Anterior           Anika Goodwin MD

## 2019-10-05 NOTE — PROGRESS NOTES
"Labor Note  34 year old  at 36w3d here after PPROM    Feeling contractions mildly, some more than others    /53   Pulse 82   Temp 98.1  F (36.7  C) (Temporal)   Resp 16   Ht 1.549 m (5' 1\")   Wt 69.9 kg (154 lb)   BMI 29.10 kg/m    SVE: 3 (stretches to 4)/90/-2 (unchanged from my exam at 1115)  EFM & [place]: category 1, ctx q2-3min  Pitocin at 16mU/min    Plan  -TOLAC/PPROM: cervix unchanged for 12 hours despite 9.5 hrs of pitocin, no forebag palpated  Discussed situation with patient, no change despite pitocin, unable to use other medications with TOLAC  Unlikely cervix will change with continued pitocin  At this time would recommend repeat  for arrest of dilation, patient to discuss further with her     Anika Goodwin MD  10/4/2019   Pager: [phone]    ---  Addendum  Patient and partner in agreement for c/section  Charge RN aware  Will proceed to  delivery when OR available   Pitocin off, FHT category 1    Anika Goodwin MD  10/4/2019     "

## 2019-10-05 NOTE — OP NOTE
[facility] Obstetrics Operative Note    Pre-operative diagnoses:  -Intrauterine pregnancy at 36w4d   -PPROM  -Arrest of dilation, failed TOLAC    Post-operative diagnoses:  -Same, delivered    Procedure: Repeat low transverse  section    Surgeon: Anika Goodwin MD, MD  Assistant: none    Anesthesia: Spinal  QBL: 480 mL  IVF: see anesthesia documentation   Drains: Ryan catheter to gravity draining clear yellow urine at end of case    Complications: None    Indications: 34 year old  at 36w4d who presented after PPROM in early labor. Desired TOLAC (history of 2 prior low transverse  section for face presentation, mentum posterior). She progressed from 1-2 to 3-4 and then had no further progress despite 9+ hours of pitocin. Decision made to proceed with repeat  section.    Findings: Grossly normal appearing uterus, tubes, and ovaries. Female infant delivered from OA position weighing  2.505 kg (5lbs 8.4oz). Apgars were 8 and 9 at 1 and 5 minutes respectively.     Procedure: The patient was taken to the operating room where spinal anesthesia was administered without issue by anesthesia. She was the prepped and draped in the normal sterile fashion. A time out was performed and all were in agreement.    A pfannenstiel skin incision was then made with the scalpel and carried through to the underlying fascia with the Bovie. The fascia was then incised in the midline and the incision extended laterally with the Agrawal scissors. The superior aspect of the fascia was grasped with the Kocher clamps, elevated, and the underlying rectus muscles dissected off bluntly and with the Agrawal scissors. At this time the peritoneum was entered sharply. Attention was then turned to the inferior aspect of the incision which, in a similar fashion was gasped, tented up with Kocher clamps, and the rectus muscle dissected off bluntly and with the Agrawal scissors. The peritoneal incision was then  extended superiorly and inferiorly with good visualization of the bladder.    The uterus was palpated and the fetal position confirmed. A bladder blade was inserted and the lower uterine segment incised in a transverse fashion with the scalpel. The uterine incision was then extended laterally bluntly. The bladder blade was removed and the infant's head was delivered atraumatically. The nose and mouth were suctioned and the cord was clamped and cut. The infant was handed off to the waiting nursing staff. Cord blood was obtained.    The placenta was then removed manually. The uterus was exteriorized and cleared of all clots and debris. The uterine incision was repaired in two layers of 0 Monocryl in a running, locked fashion. Hemostasis was noted. The uterus was returned to the abdomen and gutters were cleared of all clots. The fascia was closed with 0 Vicryl.  The subcutaneous tissue was re-approximated with 0 Plain. The skin was closed with a running stitch of 4-0 Monocryl.    The patient tolerated the procedure well.  Sponge, lap, and needle counts were correct times two per nursing staff.  The patient was taken to the recovery room in stable condition.    Anika Goodwin MD, MD  10/5/2019

## 2019-10-05 NOTE — LACTATION NOTE
Initial Lactation visit. Hand out given. Recommend unlimited, frequent breast feedings: At least 8 - 12 times every 24 hours. Avoid pacifiers and supplementation with formula unless medically indicated. Explained benefits of holding baby skin on skin to help promote better breastfeeding outcomes.     Shayla states her LPT baby has latched and fed well a couple times since birth but has been sleepy for feedings since. Discussed expected feeding patterns for a LPT infant. She is pumping after each feeding and supplementing infant with donor breast milk. Discussed using breast massage and hand expression to boost milk supply. Demonstrated to Shayla how to hand express. Shayla able to hand express several large drops of colostrum. Encouraged her to call staff for latch checks and assist with feedings as needed. Shayla appreciative of my visit. Will revisit as needed.     Roxy Diaz RN IBCLC

## 2019-10-06 LAB — HGB BLD-MCNC: 10 G/DL (ref 11.7–15.7)

## 2019-10-06 PROCEDURE — 85018 HEMOGLOBIN: CPT | Performed by: OBSTETRICS & GYNECOLOGY

## 2019-10-06 PROCEDURE — 36415 COLL VENOUS BLD VENIPUNCTURE: CPT | Performed by: OBSTETRICS & GYNECOLOGY

## 2019-10-06 PROCEDURE — 12000035 ZZH R&B POSTPARTUM

## 2019-10-06 PROCEDURE — 25000132 ZZH RX MED GY IP 250 OP 250 PS 637: Performed by: OBSTETRICS & GYNECOLOGY

## 2019-10-06 RX ADMIN — ACETAMINOPHEN 975 MG: 325 TABLET, FILM COATED ORAL at 18:00

## 2019-10-06 RX ADMIN — IBUPROFEN 600 MG: 600 TABLET, FILM COATED ORAL at 18:00

## 2019-10-06 RX ADMIN — IBUPROFEN 600 MG: 600 TABLET, FILM COATED ORAL at 05:56

## 2019-10-06 RX ADMIN — ACETAMINOPHEN 975 MG: 325 TABLET, FILM COATED ORAL at 05:56

## 2019-10-06 RX ADMIN — SENNOSIDES AND DOCUSATE SODIUM 2 TABLET: 8.6; 5 TABLET ORAL at 10:51

## 2019-10-06 RX ADMIN — SENNOSIDES AND DOCUSATE SODIUM 1 TABLET: 8.6; 5 TABLET ORAL at 19:58

## 2019-10-06 RX ADMIN — OXYCODONE HYDROCHLORIDE 5 MG: 5 TABLET ORAL at 16:12

## 2019-10-06 RX ADMIN — OXYCODONE HYDROCHLORIDE 5 MG: 5 TABLET ORAL at 19:58

## 2019-10-06 RX ADMIN — IBUPROFEN 800 MG: 400 TABLET ORAL at 12:00

## 2019-10-06 RX ADMIN — ACETAMINOPHEN 975 MG: 325 TABLET, FILM COATED ORAL at 12:01

## 2019-10-06 NOTE — LACTATION NOTE
Routine visit. Shayla states her LPT has continued to be sleepy with most feedings but has done ok. Infant skin to skin at time of my visit. Assisted Shayla to latch infant in cross cradle hold with shield in place. Infant able to latch but not eager to suck. Shayla had previously pumped 20 mls of ebm. SNS with EBM placed and infant took 20 mls without difficulty.Encouraged Shayla to continue with current feeding plan and pump after each feeding. Encouraged her to continue calling staff for latch checks and assist with feedings as needed. Shayla appreciative of my visit. Will revisit as needed.

## 2019-10-06 NOTE — PROGRESS NOTES
Postpartum Day 1:     Principal Problem:     delivery, delivered, current hospitalization  Active Problems:    Indication for care in labor or delivery    History of  section     premature rupture of membranes in third trimester       Subjective:  Patient reports doing well today. Pain overall controlled.  Ambulating and voiding without issue.  Tolerating regular diet without N/V.  No fever, chills, chest pain, shortness of breath.  Working on breastfeeding.  Would like to go home tomorrow if everything still going well.    Objective:  Patient Vitals for the past 12 hrs:   BP Temp Temp src Heart Rate Resp SpO2   10/05/19 2345 98/58 98.2  F (36.8  C) Oral 69 16 99 %   10/05/19 2200 -- -- -- -- 16 99 %   10/05/19 2100 100/58 97.7  F (36.5  C) Oral 83 16 98 %   10/05/19 2000 -- -- -- -- 16 99 %      Recent Labs   Lab Test 10/06/19  0716   HGB 10.0*     Gen: NAD, lying in bed  Abd: soft, minimally tender, firm fundus at umbilicus  Incision: c/d/i with steri strips  Extrem:  1+ ELTON bilat, non-tender      Assessment/Plan: 34 year old  POD#1, recovering well  -routine postpartum care  Anticipate discharge home POD#2    Anika Goodwin MD  10/6/2019   Pager: [phone]

## 2019-10-06 NOTE — PLAN OF CARE
Vital signs stable, except for BP that runs lower. Patient is up independently without dizzines. Postpartum assessment WDL. Incision clean, dry and intact with steri strips. Pain controlled with tylenol and ibuprofen every 6 hours as needed. Patient did complain of more pain, rating 6/10 when medications were available. Offered oxycodone as needed. She declined at this time, but is aware it is available. Patient ambulating independently, for short distances. Encouraged to walk as able. Patient reports passing gas. Breastfeeding on cue, infant sleepy at breast, working on feeding attempts with RN assist. Pumping after feedings and getting increasing amounts of EBM. Encouraged pumping every 3 hours. Patient did score a 13 on [place] depression screen. Social work consult placed. Patient and infant bonding well. Will continue with current plan of care.

## 2019-10-06 NOTE — PROGRESS NOTES
SW: Consult acknowledged for EPDS score of 13. SW attempted to meet with pt today to provide support and resources, however per RN the mother is just to begin a breastfeeding and wants lactation present for guidance. SW will return when more appropriate, anticipate discharge 10/7 vs 10/8.     DEIDRA Kumar, [facility] HospitalSW  Daytime (8:00am-4:30pm): [phone]  After-Hours SW Pager (4:30pm-11:30pm): [phone]

## 2019-10-06 NOTE — PLAN OF CARE
VSS. Fundus firm, scant flow. Ryan cath removed with good output. Able to void x2. IV removed. Ambulating with standby assist. Pumping every 3 hours. Incision dressing removed, steri strips in place. Taking tylenol and ibuprofen for pain, declining the need for narcotics. All questions addressed. Will continue to monitor.

## 2019-10-06 NOTE — PLAN OF CARE
Pt ambulated to BR. Tolerated well. IV REBECA'ginny. [place] patent. Regular diet with no N/V. Good pain control with tylenol and toradol. Attempts at nursing. Pumping.

## 2019-10-07 VITALS
RESPIRATION RATE: 16 BRPM | HEART RATE: 62 BPM | OXYGEN SATURATION: 99 % | WEIGHT: 154 LBS | BODY MASS INDEX: 29.07 KG/M2 | HEIGHT: 61 IN | DIASTOLIC BLOOD PRESSURE: 65 MMHG | SYSTOLIC BLOOD PRESSURE: 99 MMHG | TEMPERATURE: 97.7 F

## 2019-10-07 PROCEDURE — 25000132 ZZH RX MED GY IP 250 OP 250 PS 637: Performed by: OBSTETRICS & GYNECOLOGY

## 2019-10-07 RX ORDER — OXYCODONE HYDROCHLORIDE 5 MG/1
5 TABLET ORAL EVERY 6 HOURS PRN
Qty: 20 TABLET | Refills: 0 | Status: SHIPPED | OUTPATIENT
Start: 2019-10-07 | End: 2021-11-09

## 2019-10-07 RX ORDER — IBUPROFEN 600 MG/1
600 TABLET, FILM COATED ORAL EVERY 6 HOURS PRN
Qty: 30 TABLET | Refills: 0 | Status: SHIPPED | OUTPATIENT
Start: 2019-10-07 | End: 2021-11-09

## 2019-10-07 RX ORDER — ACETAMINOPHEN 325 MG/1
650 TABLET ORAL EVERY 4 HOURS PRN
Qty: 50 TABLET | Refills: 0 | Status: SHIPPED | OUTPATIENT
Start: 2019-10-07 | End: 2021-11-09

## 2019-10-07 RX ADMIN — SENNOSIDES AND DOCUSATE SODIUM 1 TABLET: 8.6; 5 TABLET ORAL at 08:06

## 2019-10-07 RX ADMIN — OXYCODONE HYDROCHLORIDE 5 MG: 5 TABLET ORAL at 00:19

## 2019-10-07 RX ADMIN — OXYCODONE HYDROCHLORIDE 5 MG: 5 TABLET ORAL at 10:53

## 2019-10-07 RX ADMIN — ACETAMINOPHEN 650 MG: 325 TABLET, FILM COATED ORAL at 08:06

## 2019-10-07 RX ADMIN — IBUPROFEN 600 MG: 600 TABLET, FILM COATED ORAL at 08:06

## 2019-10-07 RX ADMIN — ACETAMINOPHEN 975 MG: 325 TABLET, FILM COATED ORAL at 00:19

## 2019-10-07 RX ADMIN — IBUPROFEN 600 MG: 600 TABLET, FILM COATED ORAL at 00:19

## 2019-10-07 RX ADMIN — OXYCODONE HYDROCHLORIDE 5 MG: 5 TABLET ORAL at 04:30

## 2019-10-07 NOTE — PLAN OF CARE
Doing well,vss,voiding with out difficulty,pain control with tylenol,ibuprofen&oxycodone,incision intact with steri strips,scant amount of drainage noted on right side of incision,ambulating well,baby breast feeding well with nipple shield,pumping supplementing EBM using SNS at breast,out patient lactation resource reviewed,discharge today&follow up in clinic in 2 weeks or sooner with any concerns.

## 2019-10-07 NOTE — CONSULTS
Care Transition Initial Assessment - SW     Met with: Patient and Family  Principal Problem:     delivery, delivered, current hospitalization  Active Problems:    Indication for care in labor or delivery    History of  section     premature rupture of membranes in third trimester       DATA  Lives With: child(karolina), dependent, spouse      Quality of Family Relationships: helpful, involved  Description of Support System: Supportive, Involved  Who is your support system?: , Parent(s)  Support Assessment: Adequate family and caregiver support.  Identified issues/concerns regarding health management:     Quality of Family Relationships: helpful, involved     Social work consulted to complete assessment and other patient support and resources on postpartum concerns.  Pt was admitted on 10/4/19 due to leaking fluid at 36w3d.  Patient delivered on 10/5/19 via  section.  Social work met with patient and reviewed patient's chart.  Patient lives with her  and her 3 year old daughter.  Pt reported that she has a lot of support between her friends, family, and neighborhood.  Pt reported no history of postpartum Depression or Anxiety with her first child.  Pt endorsed mild symptoms of Anxiety today, primarily related to having to take care of two children.  Social work reviewed postpartum symptoms, and resources with patient and spouse.  Social work left resource packet with pt and pt's spouse.  Pt reported that pt will monitor her symptoms and contact her physician or utilize the resources provided if needed.  Pt stated that she plans to utilize her spouse, family primarily, and friends once patient and patient family return home.    ASSESSMENT  Cognitive Status:  awake, alert and oriented  Concerns to be addressed: Discharge planning; postpartum and mental health concerns.     PLAN  Patient Goals and Preferences: Discharge home with family and friend support and postpartum resources  and support information.  Patient anticipates discharging to: Home with postpartum resource packet and family and friend support.

## 2019-10-07 NOTE — PLAN OF CARE
Patient has stable vital signs.  Pain controlled with prn oxycodone, tylenol and ibuprofen.  Up in room with steady gait.  Showered.  Tolerating a regular diet well  Passing flatus.  Voiding without difficulty.  Breast feeding going well with baby girl.  Using shield and supplementing with SNS at breast expressed breast milk.  Continue to monitor.

## 2019-10-07 NOTE — PROGRESS NOTES
"POST PARTUM NOTE,  SECTION    POST-OP DAY 2:  Delivery    Feels well.  Had some incision drainage last night.  Otherwise no concerns.  Ambulating. Tolerating po. Requests to go home today.       PE  /64   Pulse 80   Temp 98  F (36.7  C) (Oral)   Resp 16   Ht 1.549 m (5' 1\")   Wt 69.9 kg (154 lb)   SpO2 99%   Breastfeeding? Unknown   BMI 29.10 kg/m  ,    No intake or output data in the 24 hours ending 10/07/19 0716    General - appears well  Incision - c/d/i, steri strips on right side very slightly moist but not saturated, no drainage expressed with palpation  Abdomen - soft, NT, ND, fundus firm, NT  Lower Extremities - no calf tenderness      Hemoglobin   Date Value Ref Range Status   10/06/2019 10.0 (L) 11.7 - 15.7 g/dL Final         Impression:   POD #2 s/p repeat CS after failed TOOLAC   Principal Problem:     delivery, delivered, current hospitalization  Active Problems:    Indication for care in labor or delivery    History of  section     premature rupture of membranes in third trimester        Plan:    Home today.  Incision check in 2 weeks, sooner if has any incision concerns, instructions reviewed.      Chula Temple MD  [facility]  10/7/19    "

## 2019-10-07 NOTE — DISCHARGE SUMMARY
[facility] Discharge Summary    Shayla De Jesus MRN# [id number]   Age: 34 year old YOB: 1985     Date of Admission:  10/4/2019  Date of Discharge::  10/7/2019  Admitting Physician:  Anika Goodwin MD  Discharge Physician:  Chula Temple MD     Home clinic: [facility]          Admission Diagnoses:   Encounter for triage in pregnant patient  Indication for care in labor or delivery  Previous           Discharge Diagnosis:   S/p repeat , failed TOLAC          Procedures:   Procedure(s): Repeat low transverse  section       No other procedures performed during this admission           Medications Prior to Admission:     Medications Prior to Admission   Medication Sig Dispense Refill Last Dose     ferrous sulfate (FEROSUL) 325 (65 Fe) MG tablet Take 325 mg by mouth daily (with breakfast)   10/3/2019 at Unknown time     Prenatal Vit-Fe Fumarate-FA (PRENATAL MULTIVITAMIN W/IRON) 27-0.8 MG tablet Take 1 tablet by mouth daily   10/3/2019 at Unknown time             Discharge Medications:     Current Discharge Medication List      START taking these medications    Details   acetaminophen (TYLENOL) 325 MG tablet Take 2 tablets (650 mg) by mouth every 4 hours as needed for mild pain or fever (greater than or equal to 38  C /100.4  F (oral) or 38.5  C/ 101.4  F (core).)  Qty: 50 tablet, Refills: 0    Associated Diagnoses:  delivery, delivered, current hospitalization      ibuprofen (ADVIL/MOTRIN) 600 MG tablet Take 1 tablet (600 mg) by mouth every 6 hours as needed for other (cramping)  Qty: 30 tablet, Refills: 0    Associated Diagnoses:  delivery, delivered, current hospitalization      oxyCODONE (ROXICODONE) 5 MG tablet Take 1 tablet (5 mg) by mouth every 6 hours as needed for moderate to severe pain  Qty: 20 tablet, Refills: 0    Associated Diagnoses:  delivery, delivered, current hospitalization         CONTINUE these medications which have NOT  CHANGED    Details   ferrous sulfate (FEROSUL) 325 (65 Fe) MG tablet Take 325 mg by mouth daily (with breakfast)      Prenatal Vit-Fe Fumarate-FA (PRENATAL MULTIVITAMIN W/IRON) 27-0.8 MG tablet Take 1 tablet by mouth daily                   Consultations:   No consultations were requested during this admission          Brief History of Labor or Admission:    at 36w4d who presented after PPROM in early labor. Desired TOLAC (history of  prior low transverse  section for face presentation, mentum posterior). She progressed from 1-2 to 3-4 and then had no further progress despite 9+ hours of pitocin. Uuncomplicated repeat low transverse  delivery of a female infant weighing 2.505 kg (5lbs 8.4oz). Apgars were 8 and 9 at 1 and 5 minutes respectively.           Hospital Course:   The patient's hospital course was unremarkable.  She recovered as anticipated and experienced no post-operative complications.  On discharge, her pain was well controlled. Vaginal bleeding is similar to peak menstrual flow.  Voiding without difficulty.  Ambulating well and tolerating a normal diet.  No fever or significant wound drainage.   Infant is stable.   She requested discharged on POD#2.     Post-partum hemoglobin:   Hemoglobin   Date Value Ref Range Status   10/06/2019 10.0 (L) 11.7 - 15.7 g/dL Final             Discharge Instructions and Follow-Up:   Discharge diet: Regular   Discharge activity: No heavy lifting, pushing, pulling for 6 week(s)  No driving until no severe pain and not taking oxycodone any longer  Pelvic rest: abstain from intercourse and do not use tampons for 6 week(s)   Discharge follow-up: Follow up with Dr. Sosa in 2 weeks isai incision check   Wound care: Keep wound clean and dry  Steri-strips off in 7 days           Discharge Disposition:   Discharged to home      Attestation:  I have reviewed today's vital signs, notes, medications, labs and imaging.    Chula Temple MD   10/7/19

## 2019-10-08 LAB — COPATH REPORT: NORMAL

## 2019-10-10 ENCOUNTER — HOSPITAL ENCOUNTER (OUTPATIENT)
Facility: CLINIC | Age: 34
End: 2019-10-10
Admitting: SPECIALIST
Payer: COMMERCIAL

## 2020-03-11 ENCOUNTER — HEALTH MAINTENANCE LETTER (OUTPATIENT)
Age: 35
End: 2020-03-11

## 2021-01-03 ENCOUNTER — HEALTH MAINTENANCE LETTER (OUTPATIENT)
Age: 36
End: 2021-01-03

## 2021-04-25 ENCOUNTER — HEALTH MAINTENANCE LETTER (OUTPATIENT)
Age: 36
End: 2021-04-25

## 2021-10-10 ENCOUNTER — HEALTH MAINTENANCE LETTER (OUTPATIENT)
Age: 36
End: 2021-10-10

## 2021-11-09 ENCOUNTER — OFFICE VISIT (OUTPATIENT)
Dept: FAMILY MEDICINE | Facility: CLINIC | Age: 36
End: 2021-11-09
Payer: COMMERCIAL

## 2021-11-09 VITALS
DIASTOLIC BLOOD PRESSURE: 70 MMHG | HEART RATE: 74 BPM | TEMPERATURE: 97.2 F | HEIGHT: 61 IN | OXYGEN SATURATION: 99 % | BODY MASS INDEX: 24.92 KG/M2 | SYSTOLIC BLOOD PRESSURE: 118 MMHG | RESPIRATION RATE: 20 BRPM | WEIGHT: 132 LBS

## 2021-11-09 DIAGNOSIS — Z00.00 ROUTINE GENERAL MEDICAL EXAMINATION AT A HEALTH CARE FACILITY: Primary | ICD-10-CM

## 2021-11-09 DIAGNOSIS — Z13.29 SCREENING FOR THYROID DISORDER: ICD-10-CM

## 2021-11-09 DIAGNOSIS — E55.9 VITAMIN D DEFICIENCY: ICD-10-CM

## 2021-11-09 DIAGNOSIS — K21.00 GASTROESOPHAGEAL REFLUX DISEASE WITH ESOPHAGITIS, UNSPECIFIED WHETHER HEMORRHAGE: ICD-10-CM

## 2021-11-09 DIAGNOSIS — Z13.0 SCREENING FOR DEFICIENCY ANEMIA: ICD-10-CM

## 2021-11-09 DIAGNOSIS — Z13.220 SCREENING FOR HYPERLIPIDEMIA: ICD-10-CM

## 2021-11-09 DIAGNOSIS — Z11.59 NEED FOR HEPATITIS C SCREENING TEST: ICD-10-CM

## 2021-11-09 DIAGNOSIS — Z13.21 ENCOUNTER FOR VITAMIN DEFICIENCY SCREENING: ICD-10-CM

## 2021-11-09 DIAGNOSIS — Z12.4 CERVICAL CANCER SCREENING: ICD-10-CM

## 2021-11-09 DIAGNOSIS — Z13.1 SCREENING FOR DIABETES MELLITUS (DM): ICD-10-CM

## 2021-11-09 PROCEDURE — 87624 HPV HI-RISK TYP POOLED RSLT: CPT | Performed by: FAMILY MEDICINE

## 2021-11-09 PROCEDURE — 90471 IMMUNIZATION ADMIN: CPT | Performed by: FAMILY MEDICINE

## 2021-11-09 PROCEDURE — 99385 PREV VISIT NEW AGE 18-39: CPT | Mod: 25 | Performed by: FAMILY MEDICINE

## 2021-11-09 PROCEDURE — G0145 SCR C/V CYTO,THINLAYER,RESCR: HCPCS | Performed by: FAMILY MEDICINE

## 2021-11-09 PROCEDURE — 90686 IIV4 VACC NO PRSV 0.5 ML IM: CPT | Performed by: FAMILY MEDICINE

## 2021-11-09 SDOH — HEALTH STABILITY: PHYSICAL HEALTH: ON AVERAGE, HOW MANY DAYS PER WEEK DO YOU ENGAGE IN MODERATE TO STRENUOUS EXERCISE (LIKE A BRISK WALK)?: 1 DAY

## 2021-11-09 SDOH — ECONOMIC STABILITY: FOOD INSECURITY: WITHIN THE PAST 12 MONTHS, YOU WORRIED THAT YOUR FOOD WOULD RUN OUT BEFORE YOU GOT MONEY TO BUY MORE.: NEVER TRUE

## 2021-11-09 SDOH — ECONOMIC STABILITY: FOOD INSECURITY: WITHIN THE PAST 12 MONTHS, THE FOOD YOU BOUGHT JUST DIDN'T LAST AND YOU DIDN'T HAVE MONEY TO GET MORE.: NEVER TRUE

## 2021-11-09 SDOH — ECONOMIC STABILITY: INCOME INSECURITY: IN THE LAST 12 MONTHS, WAS THERE A TIME WHEN YOU WERE NOT ABLE TO PAY THE MORTGAGE OR RENT ON TIME?: NO

## 2021-11-09 SDOH — ECONOMIC STABILITY: TRANSPORTATION INSECURITY
IN THE PAST 12 MONTHS, HAS LACK OF TRANSPORTATION KEPT YOU FROM MEETINGS, WORK, OR FROM GETTING THINGS NEEDED FOR DAILY LIVING?: NO

## 2021-11-09 SDOH — HEALTH STABILITY: PHYSICAL HEALTH: ON AVERAGE, HOW MANY MINUTES DO YOU ENGAGE IN EXERCISE AT THIS LEVEL?: 20 MIN

## 2021-11-09 SDOH — ECONOMIC STABILITY: INCOME INSECURITY: HOW HARD IS IT FOR YOU TO PAY FOR THE VERY BASICS LIKE FOOD, HOUSING, MEDICAL CARE, AND HEATING?: NOT HARD AT ALL

## 2021-11-09 SDOH — ECONOMIC STABILITY: TRANSPORTATION INSECURITY
IN THE PAST 12 MONTHS, HAS THE LACK OF TRANSPORTATION KEPT YOU FROM MEDICAL APPOINTMENTS OR FROM GETTING MEDICATIONS?: NO

## 2021-11-09 ASSESSMENT — ENCOUNTER SYMPTOMS
CHILLS: 0
DIZZINESS: 0
FREQUENCY: 0
DIARRHEA: 0
EYE PAIN: 0
NERVOUS/ANXIOUS: 1
COUGH: 0
CONSTIPATION: 0
HEADACHES: 0
FEVER: 0
HEMATURIA: 0
HEMATOCHEZIA: 0
ABDOMINAL PAIN: 1

## 2021-11-09 ASSESSMENT — SOCIAL DETERMINANTS OF HEALTH (SDOH)
HOW OFTEN DO YOU ATTEND CHURCH OR RELIGIOUS SERVICES?: 1 TO 4 TIMES PER YEAR
DO YOU BELONG TO ANY CLUBS OR ORGANIZATIONS SUCH AS CHURCH GROUPS UNIONS, FRATERNAL OR ATHLETIC GROUPS, OR SCHOOL GROUPS?: NO
IN A TYPICAL WEEK, HOW MANY TIMES DO YOU TALK ON THE PHONE WITH FAMILY, FRIENDS, OR NEIGHBORS?: MORE THAN THREE TIMES A WEEK
HOW OFTEN DO YOU GET TOGETHER WITH FRIENDS OR RELATIVES?: ONCE A WEEK

## 2021-11-09 ASSESSMENT — LIFESTYLE VARIABLES
HOW MANY STANDARD DRINKS CONTAINING ALCOHOL DO YOU HAVE ON A TYPICAL DAY: PATIENT DECLINED
HOW OFTEN DO YOU HAVE A DRINK CONTAINING ALCOHOL: NEVER
HOW OFTEN DO YOU HAVE SIX OR MORE DRINKS ON ONE OCCASION: NEVER

## 2021-11-09 ASSESSMENT — MIFFLIN-ST. JEOR: SCORE: 1231.13

## 2021-11-09 NOTE — PROGRESS NOTES
SUBJECTIVE:   CC: Shayla De Jesus is an 36 year old woman who presents for preventive health visit.     Patient is new to the provider, is here to establish care and for annual physical  Patient has been advised of split billing requirements and indicates understanding: Yes  Healthy Habits:     Getting at least 3 servings of Calcium per day:  Yes    Bi-annual eye exam:  Yes    Dental care twice a year:  Yes    Sleep apnea or symptoms of sleep apnea:  None    Diet:  Regular (no restrictions)    Frequency of exercise:  None    Taking medications regularly:  Yes    Medication side effects:  None    PHQ-2 Total Score: 2    Additional concerns today:  Yes              Today's PHQ-2 Score:   PHQ-2 (  [employer]) 2017   Q1: Little interest or pleasure in doing things 0   Q2: Feeling down, depressed or hopeless 1   PHQ-2 Score 1       Abuse: Current or Past (Physical, Sexual or Emotional) - No  Do you feel safe in your environment? Yes    Have you ever done Advance Care Planning? (For example, a Health Directive, POLST, or a discussion with a medical provider or your loved ones about your wishes): no    Social History     Tobacco Use     Smoking status: Never Smoker     Smokeless tobacco: Never Used   Substance Use Topics     Alcohol use: No     If you drink alcohol do you typically have >3 drinks per day or >7 drinks per week? No    No flowsheet data found.No flowsheet data found.    Reviewed orders with patient.  Reviewed health maintenance and updated orders accordingly - Yes  Lab work is in process  Labs reviewed in EPIC  BP Readings from Last 3 Encounters:   21 118/70   10/07/19 99/65   17 90/58    Wt Readings from Last 3 Encounters:   21 59.9 kg (132 lb)   10/04/19 69.9 kg (154 lb)   17 58.5 kg (129 lb)                  Patient Active Problem List   Diagnosis     Indication for care in labor or delivery     History of  section      premature rupture of membranes in  third trimester      delivery, delivered, current hospitalization     Past Surgical History:   Procedure Laterality Date     APPENDECTOMY        SECTION        SECTION N/A 10/5/2019    Procedure:  SECTION;  Surgeon: Anika Goodwin MD;  Location: [facility]       Social History     Tobacco Use     Smoking status: Never Smoker     Smokeless tobacco: Never Used   Substance Use Topics     Alcohol use: No     Family History   Problem Relation Age of Onset     Diabetes Father          No current outpatient medications on file.     No Known Allergies  No lab results found.     Breast Cancer Screening:  Any new diagnosis of family breast, ovarian, or bowel cancer? No    FHS-7: No flowsheet data found.  click delete button to remove this line now  Patient under 40 years of age: Routine Mammogram Screening not recommended.   Pertinent mammograms are reviewed under the imaging tab.    History of abnormal Pap smear: NO - age 30-65 PAP every 5 years with negative HPV co-testing recommended     Reviewed and updated as needed this visit by clinical staff                Reviewed and updated as needed this visit by Provider                 History reviewed. No pertinent past medical history.   Past Surgical History:   Procedure Laterality Date     APPENDECTOMY        SECTION        SECTION N/A 10/5/2019    Procedure:  SECTION;  Surgeon: Anika Goodwin MD;  Location: [facility]     OB History    Para Term  AB Living   2 2 1 1 0 2   SAB IAB Ectopic Multiple Live Births   0 0 0 0 2      # Outcome Date GA Lbr Augustine/2nd Weight Sex Delivery Anes PTL Lv   2  10/05/19 36w4d  2.505 kg (5 lb 8.4 oz) F CS-LTranv Spinal Y DUYEN      Complications: Failure to Progress in First Stage      Name: HUMBLEFEMALE-ELLIS      Apgar1: 8  Apgar5: 9   1 Term 16    F -SEC   DUYEN      Complications: Face presentation of fetus       Review of Systems    Constitutional: Negative for chills and fever.   HENT: Negative for congestion, ear pain and hearing loss.    Eyes: Negative for pain.   Respiratory: Negative for cough.    Cardiovascular: Negative for chest pain.   Gastrointestinal: Positive for abdominal pain. Negative for constipation, diarrhea and hematochezia.   Genitourinary: Negative for frequency, genital sores and hematuria.   Neurological: Negative for dizziness and headaches.   Psychiatric/Behavioral: The patient is nervous/anxious.      CONSTITUTIONAL: NEGATIVE for fever, chills, change in weight  INTEGUMENTARU/SKIN: NEGATIVE for worrisome rashes, moles or lesions  EYES: NEGATIVE for vision changes or irritation  ENT: NEGATIVE for ear, mouth and throat problems  RESP: NEGATIVE for significant cough or SOB  BREAST: NEGATIVE for masses, tenderness or discharge  CV: NEGATIVE for chest pain, palpitations or peripheral edema  GI: Ongoing intermittent episodes of epigastric pain, GERD  Had endoscopy done few years ago that was negative  : NEGATIVE for unusual urinary or vaginal symptoms. Periods are regular.  MUSCULOSKELETAL: NEGATIVE for significant arthralgias or myalgia  NEURO: NEGATIVE for weakness, dizziness or paresthesias  ENDOCRINE: NEGATIVE for temperature intolerance, skin/hair changes  HEME/ALLERGY/IMMUNE: NEGATIVE for bleeding problems  PSYCHIATRIC: NEGATIVE for changes in mood or affect     OBJECTIVE:   There were no vitals taken for this visit.  Physical Exam  GENERAL: healthy, alert and no distress  EYES: Eyes grossly normal to inspection, PERRL and conjunctivae and sclerae normal  HENT: ear canals and TM's normal, nose and mouth without ulcers or lesions  NECK: no adenopathy, no asymmetry, masses, or scars and thyroid normal to palpation  RESP: lungs clear to auscultation - no rales, rhonchi or wheezes  BREAST: normal without masses, tenderness or nipple discharge and no palpable axillary masses or adenopathy  CV: regular rate and rhythm,  normal S1 S2, no S3 or S4, no murmur, click or rub, no peripheral edema and peripheral pulses strong  ABDOMEN: soft, nontender, no hepatosplenomegaly, no masses and bowel sounds normal   (female): normal female external genitalia, normal urethral meatus, vaginal mucosa pink, moist, well rugated, and normal cervix/adnexa/uterus without masses or discharge  MS: no gross musculoskeletal defects noted, no edema  SKIN: no suspicious lesions or rashes  NEURO: Normal strength and tone, mentation intact and speech normal  PSYCH: mentation appears normal, affect normal/bright    Diagnostic Test Results:  Labs reviewed in Epic    ASSESSMENT/PLAN:   (Z00.00) Routine general medical examination at a health care facility  (primary encounter diagnosis)  Comment:   Plan: Discussed on regular exercises, healthy eating, self breast exams monthly and routine dental checks.  Patient brought biometric screening forms today, she will get her fasting labs done this week and we will fill the forms later when this is done    (Z11.59) Need for hepatitis C screening test  Comment:   Plan: Hepatitis C antibody, CANCELED: Hepatitis C         Screen Reflex to HCV RNA Quant and Genotype            (Z12.4) Cervical cancer screening  Comment:   Plan: Pap Screen with HPV - recommended age 30 - 65         years            (Z13.0) Screening for deficiency anemia  Comment:   Plan: CBC with platelets            (Z13.1) Screening for diabetes mellitus (DM)  Comment:   Plan: Comprehensive metabolic panel (BMP + Alb, Alk         Phos, ALT, AST, Total. Bili, TP)            (Z13.29) Screening for thyroid disorder  Comment:   Plan: TSH with free T4 reflex            (Z13.21) Encounter for vitamin deficiency screening  Comment:   Plan: Vitamin D Deficiency            (E55.9) Vitamin D deficiency  Comment:   Plan: Vitamin D Deficiency        Patient had history of vitamin D deficiency in 2017, not on current supplements    (Z13.220) Screening for  "hyperlipidemia  Comment:   Plan: Lipid panel reflex to direct LDL Fasting            (K21.00) Gastroesophageal reflux disease with esophagitis, unspecified whether hemorrhage  Comment:   Plan: Reviewed reflux precautions, recommended to give a trial of over-the-counter 20 mg 1-2 times a day as needed  Follow-up  for persistent or worsening concerns  Patient verbalised understanding and is agreeable to the plan.      Patient has been advised of split billing requirements and indicates understanding: Yes  COUNSELING:  Reviewed preventive health counseling, as reflected in patient instructions  Special attention given to:        Regular exercise       Healthy diet/nutrition       Vision screening       Immunizations    Vaccinated for: Influenza             Contraception       Family planning       Folic Acid       Consider Hep C screening for all patients one time for ages 18-79 years    Estimated body mass index is 29.1 kg/m  as calculated from the following:    Height as of 10/4/19: 1.549 m (5' 1\").    Weight as of 10/4/19: 69.9 kg (154 lb).        She reports that she has never smoked. She has never used smokeless tobacco.      Counseling Resources:  ATP IV Guidelines  Pooled Cohorts Equation Calculator  Breast Cancer Risk Calculator  BRCA-Related Cancer Risk Assessment: FHS-7 Tool  FRAX Risk Assessment  ICSI Preventive Guidelines  Dietary Guidelines for Americans, 2010  USDA's MyPlate  ASA Prophylaxis  Lung CA Screening    Nallely Persaud MD  [facility]  Chart documentation done in part with Dragon Voice recognition Software. Although reviewed after completion, some word and grammatical error may remain.    "

## 2021-11-11 LAB
BKR LAB AP GYN ADEQUACY: NORMAL
BKR LAB AP GYN INTERPRETATION: NORMAL
BKR LAB AP HPV REFLEX: NORMAL
BKR LAB AP LMP: NORMAL
BKR LAB AP PREVIOUS ABNORMAL: NORMAL
PATH REPORT.COMMENTS IMP SPEC: NORMAL
PATH REPORT.COMMENTS IMP SPEC: NORMAL
PATH REPORT.RELEVANT HX SPEC: NORMAL

## 2021-11-14 LAB
HUMAN PAPILLOMA VIRUS 16 DNA: NEGATIVE
HUMAN PAPILLOMA VIRUS 18 DNA: NEGATIVE
HUMAN PAPILLOMA VIRUS FINAL DIAGNOSIS: NORMAL
HUMAN PAPILLOMA VIRUS OTHER HR: NEGATIVE

## 2021-11-15 ENCOUNTER — LAB (OUTPATIENT)
Dept: LAB | Facility: CLINIC | Age: 36
End: 2021-11-15
Payer: COMMERCIAL

## 2021-11-15 DIAGNOSIS — Z13.21 ENCOUNTER FOR VITAMIN DEFICIENCY SCREENING: ICD-10-CM

## 2021-11-15 DIAGNOSIS — Z13.0 SCREENING FOR DEFICIENCY ANEMIA: ICD-10-CM

## 2021-11-15 DIAGNOSIS — Z13.1 SCREENING FOR DIABETES MELLITUS (DM): ICD-10-CM

## 2021-11-15 DIAGNOSIS — Z13.29 SCREENING FOR THYROID DISORDER: ICD-10-CM

## 2021-11-15 DIAGNOSIS — Z13.220 SCREENING FOR HYPERLIPIDEMIA: ICD-10-CM

## 2021-11-15 DIAGNOSIS — E55.9 VITAMIN D DEFICIENCY: ICD-10-CM

## 2021-11-15 DIAGNOSIS — Z11.59 NEED FOR HEPATITIS C SCREENING TEST: ICD-10-CM

## 2021-11-15 LAB
DEPRECATED CALCIDIOL+CALCIFEROL SERPL-MC: 25 UG/L (ref 20–75)
ERYTHROCYTE [DISTWIDTH] IN BLOOD BY AUTOMATED COUNT: 11.9 % (ref 10–15)
HCT VFR BLD AUTO: 35.6 % (ref 35–47)
HCV AB SERPL QL IA: NONREACTIVE
HGB BLD-MCNC: 12.1 G/DL (ref 11.7–15.7)
MCH RBC QN AUTO: 31 PG (ref 26.5–33)
MCHC RBC AUTO-ENTMCNC: 34 G/DL (ref 31.5–36.5)
MCV RBC AUTO: 91 FL (ref 78–100)
PLATELET # BLD AUTO: 205 10E3/UL (ref 150–450)
RBC # BLD AUTO: 3.9 10E6/UL (ref 3.8–5.2)
WBC # BLD AUTO: 5.1 10E3/UL (ref 4–11)

## 2021-11-15 PROCEDURE — 84443 ASSAY THYROID STIM HORMONE: CPT

## 2021-11-15 PROCEDURE — 80061 LIPID PANEL: CPT

## 2021-11-15 PROCEDURE — 80053 COMPREHEN METABOLIC PANEL: CPT

## 2021-11-15 PROCEDURE — 82306 VITAMIN D 25 HYDROXY: CPT

## 2021-11-15 PROCEDURE — 36415 COLL VENOUS BLD VENIPUNCTURE: CPT

## 2021-11-15 PROCEDURE — 85027 COMPLETE CBC AUTOMATED: CPT

## 2021-11-15 PROCEDURE — 86803 HEPATITIS C AB TEST: CPT

## 2021-11-16 LAB
ALBUMIN SERPL-MCNC: 3.6 G/DL (ref 3.4–5)
ALP SERPL-CCNC: 35 U/L (ref 40–150)
ALT SERPL W P-5'-P-CCNC: 17 U/L (ref 0–50)
ANION GAP SERPL CALCULATED.3IONS-SCNC: 8 MMOL/L (ref 3–14)
AST SERPL W P-5'-P-CCNC: 17 U/L (ref 0–45)
BILIRUB SERPL-MCNC: 0.5 MG/DL (ref 0.2–1.3)
BUN SERPL-MCNC: 11 MG/DL (ref 7–30)
CALCIUM SERPL-MCNC: 8.5 MG/DL (ref 8.5–10.1)
CHLORIDE BLD-SCNC: 106 MMOL/L (ref 94–109)
CHOLEST SERPL-MCNC: 144 MG/DL
CO2 SERPL-SCNC: 23 MMOL/L (ref 20–32)
CREAT SERPL-MCNC: 0.62 MG/DL (ref 0.52–1.04)
FASTING STATUS PATIENT QL REPORTED: YES
GFR SERPL CREATININE-BSD FRML MDRD: >90 ML/MIN/1.73M2
GLUCOSE BLD-MCNC: 83 MG/DL (ref 70–99)
HDLC SERPL-MCNC: 54 MG/DL
LDLC SERPL CALC-MCNC: 78 MG/DL
NONHDLC SERPL-MCNC: 90 MG/DL
POTASSIUM BLD-SCNC: 3.8 MMOL/L (ref 3.4–5.3)
PROT SERPL-MCNC: 7.5 G/DL (ref 6.8–8.8)
SODIUM SERPL-SCNC: 137 MMOL/L (ref 133–144)
TRIGL SERPL-MCNC: 61 MG/DL
TSH SERPL DL<=0.005 MIU/L-ACNC: 2.41 MU/L (ref 0.4–4)

## 2021-11-16 NOTE — RESULT ENCOUNTER NOTE
Dear Shayla,  Your lab test showed normal hemoglobin with no concern for anemia, normal fasting blood sugar with no concern for diabetes, normal liver, kidney and thyroid functions and excellent fasting cholesterol numbers.  Your hepatitis C screening test is negative.  These are all very good and reassuring  Your vitamin D is closer to the low normal range, you can start taking over-the-counter vitamin D 2000 units daily.   Let me know if you have any questions. Take care.  Nallely Persaud MD

## 2022-09-17 ENCOUNTER — HEALTH MAINTENANCE LETTER (OUTPATIENT)
Age: 37
End: 2022-09-17

## 2022-10-27 PROBLEM — E55.9 VITAMIN D DEFICIENCY: Status: ACTIVE | Noted: 2022-10-27

## 2022-11-30 ENCOUNTER — OFFICE VISIT (OUTPATIENT)
Dept: FAMILY MEDICINE | Facility: CLINIC | Age: 37
End: 2022-11-30
Payer: COMMERCIAL

## 2022-11-30 VITALS
SYSTOLIC BLOOD PRESSURE: 99 MMHG | HEIGHT: 61 IN | BODY MASS INDEX: 25.86 KG/M2 | HEART RATE: 82 BPM | WEIGHT: 137 LBS | DIASTOLIC BLOOD PRESSURE: 67 MMHG | TEMPERATURE: 97.9 F | OXYGEN SATURATION: 97 % | RESPIRATION RATE: 16 BRPM

## 2022-11-30 DIAGNOSIS — R82.90 ABNORMAL FINDING ON URINALYSIS: ICD-10-CM

## 2022-11-30 DIAGNOSIS — Z13.1 SCREENING FOR DIABETES MELLITUS (DM): ICD-10-CM

## 2022-11-30 DIAGNOSIS — J01.90 ACUTE SINUSITIS WITH SYMPTOMS GREATER THAN 10 DAYS: ICD-10-CM

## 2022-11-30 DIAGNOSIS — Z13.0 SCREENING FOR DEFICIENCY ANEMIA: ICD-10-CM

## 2022-11-30 DIAGNOSIS — Z00.00 ROUTINE GENERAL MEDICAL EXAMINATION AT A HEALTH CARE FACILITY: Primary | ICD-10-CM

## 2022-11-30 DIAGNOSIS — R10.2 PELVIC PAIN IN FEMALE: ICD-10-CM

## 2022-11-30 DIAGNOSIS — R10.13 ABDOMINAL PAIN, EPIGASTRIC: ICD-10-CM

## 2022-11-30 DIAGNOSIS — R09.81 NASAL CONGESTION: ICD-10-CM

## 2022-11-30 DIAGNOSIS — E55.9 VITAMIN D DEFICIENCY: ICD-10-CM

## 2022-11-30 DIAGNOSIS — Z13.220 SCREENING FOR HYPERLIPIDEMIA: ICD-10-CM

## 2022-11-30 LAB
ALBUMIN SERPL-MCNC: 3.8 G/DL (ref 3.4–5)
ALBUMIN UR-MCNC: NEGATIVE MG/DL
ALP SERPL-CCNC: 44 U/L (ref 40–150)
ALT SERPL W P-5'-P-CCNC: 19 U/L (ref 0–50)
ANION GAP SERPL CALCULATED.3IONS-SCNC: 4 MMOL/L (ref 3–14)
APPEARANCE UR: CLEAR
AST SERPL W P-5'-P-CCNC: 18 U/L (ref 0–45)
BACTERIA #/AREA URNS HPF: ABNORMAL /HPF
BILIRUB SERPL-MCNC: 0.4 MG/DL (ref 0.2–1.3)
BILIRUB UR QL STRIP: NEGATIVE
BUN SERPL-MCNC: 9 MG/DL (ref 7–30)
CALCIUM SERPL-MCNC: 8.5 MG/DL (ref 8.5–10.1)
CHLORIDE BLD-SCNC: 108 MMOL/L (ref 94–109)
CHOLEST SERPL-MCNC: 134 MG/DL
CO2 SERPL-SCNC: 25 MMOL/L (ref 20–32)
COLOR UR AUTO: YELLOW
CREAT SERPL-MCNC: 0.58 MG/DL (ref 0.52–1.04)
DEPRECATED CALCIDIOL+CALCIFEROL SERPL-MC: 20 UG/L (ref 20–75)
ERYTHROCYTE [DISTWIDTH] IN BLOOD BY AUTOMATED COUNT: 12.5 % (ref 10–15)
FASTING STATUS PATIENT QL REPORTED: YES
GFR SERPL CREATININE-BSD FRML MDRD: >90 ML/MIN/1.73M2
GLUCOSE BLD-MCNC: 99 MG/DL (ref 70–99)
GLUCOSE UR STRIP-MCNC: NEGATIVE MG/DL
HCT VFR BLD AUTO: 37.6 % (ref 35–47)
HDLC SERPL-MCNC: 60 MG/DL
HGB BLD-MCNC: 12.3 G/DL (ref 11.7–15.7)
HGB UR QL STRIP: ABNORMAL
KETONES UR STRIP-MCNC: NEGATIVE MG/DL
LDLC SERPL CALC-MCNC: 62 MG/DL
LEUKOCYTE ESTERASE UR QL STRIP: NEGATIVE
MCH RBC QN AUTO: 30.1 PG (ref 26.5–33)
MCHC RBC AUTO-ENTMCNC: 32.7 G/DL (ref 31.5–36.5)
MCV RBC AUTO: 92 FL (ref 78–100)
NITRATE UR QL: NEGATIVE
NONHDLC SERPL-MCNC: 74 MG/DL
PH UR STRIP: 5.5 [PH] (ref 5–7)
PLATELET # BLD AUTO: 209 10E3/UL (ref 150–450)
POTASSIUM BLD-SCNC: 4.2 MMOL/L (ref 3.4–5.3)
PROT SERPL-MCNC: 7.8 G/DL (ref 6.8–8.8)
RBC # BLD AUTO: 4.09 10E6/UL (ref 3.8–5.2)
RBC #/AREA URNS AUTO: ABNORMAL /HPF
RENAL EPI CELLS #/AREA URNS HPF: ABNORMAL /HPF
SODIUM SERPL-SCNC: 137 MMOL/L (ref 133–144)
SP GR UR STRIP: 1.01 (ref 1–1.03)
SQUAMOUS #/AREA URNS AUTO: ABNORMAL /LPF
TRIGL SERPL-MCNC: 58 MG/DL
UROBILINOGEN UR STRIP-ACNC: 0.2 E.U./DL
WBC # BLD AUTO: 7.8 10E3/UL (ref 4–11)
WBC #/AREA URNS AUTO: ABNORMAL /HPF
YEAST #/AREA URNS HPF: ABNORMAL /HPF

## 2022-11-30 PROCEDURE — 87086 URINE CULTURE/COLONY COUNT: CPT | Performed by: FAMILY MEDICINE

## 2022-11-30 PROCEDURE — 81001 URINALYSIS AUTO W/SCOPE: CPT | Performed by: FAMILY MEDICINE

## 2022-11-30 PROCEDURE — 99214 OFFICE O/P EST MOD 30 MIN: CPT | Mod: 25 | Performed by: FAMILY MEDICINE

## 2022-11-30 PROCEDURE — 99395 PREV VISIT EST AGE 18-39: CPT | Performed by: FAMILY MEDICINE

## 2022-11-30 PROCEDURE — 82306 VITAMIN D 25 HYDROXY: CPT | Performed by: FAMILY MEDICINE

## 2022-11-30 PROCEDURE — 36415 COLL VENOUS BLD VENIPUNCTURE: CPT | Performed by: FAMILY MEDICINE

## 2022-11-30 PROCEDURE — 80053 COMPREHEN METABOLIC PANEL: CPT | Performed by: FAMILY MEDICINE

## 2022-11-30 PROCEDURE — 85027 COMPLETE CBC AUTOMATED: CPT | Performed by: FAMILY MEDICINE

## 2022-11-30 PROCEDURE — 80061 LIPID PANEL: CPT | Performed by: FAMILY MEDICINE

## 2022-11-30 RX ORDER — FLUTICASONE PROPIONATE 50 MCG
2 SPRAY, SUSPENSION (ML) NASAL DAILY
Qty: 16 G | Refills: 1 | Status: SHIPPED | OUTPATIENT
Start: 2022-11-30 | End: 2023-07-25

## 2022-11-30 ASSESSMENT — ENCOUNTER SYMPTOMS
SHORTNESS OF BREATH: 0
BREAST MASS: 0
ABDOMINAL PAIN: 0
DIZZINESS: 0
JOINT SWELLING: 0
PALPITATIONS: 0
HEARTBURN: 0
COUGH: 1
DIARRHEA: 0
MYALGIAS: 0
HEMATOCHEZIA: 0
ARTHRALGIAS: 0
EYE PAIN: 0
WEAKNESS: 1
FREQUENCY: 0
CONSTIPATION: 0
HEMATURIA: 0
CHILLS: 0
PARESTHESIAS: 0
NERVOUS/ANXIOUS: 0
SORE THROAT: 1
HEADACHES: 1
DYSURIA: 0
FEVER: 0

## 2022-11-30 ASSESSMENT — PAIN SCALES - GENERAL: PAINLEVEL: SEVERE PAIN (6)

## 2022-11-30 NOTE — PROGRESS NOTES
SUBJECTIVE:   CC: Shayla is an 37 year old who presents for preventive health visit.   Patient is here for annual physical and with other concerns as mentioned below  Sinusitis-complaining of ongoing concerns with productive cough of yellow-green sputum with no blood in it, purulent nasal drainage occasionally mixed with blood, severe nasal congestion and stuffiness, constant postnasal drainage associated with increasing sinus pressure and headaches for the past 2 months.  Patient denies fever, chills   symptoms started 2 months ago with onset of URI  Patient reported being seen at the urgent care, was told viral URI  Patient has decreased and lack of appetite and fatigue since the onset of symptoms.  Denies wheezing, shortness of breath, history of asthma, allergies  Denies history of recurrent pneumonia, sinus infections in the past  She is finding harder to breathe through the nose due to severe stuffy nose and congestion  Has tried over-the-counter Robitussin and Mucinex with no relief of symptoms.  Epigastric pain-complaining of ongoing intermittent episodes of pain in the left upper quadrant and epigastric region associated with abdominal bloating and GERD symptoms for several months now  Denies nausea, vomiting, melena, hematemesis, previous history of gastritis, gastric ulcers  Patient takes over-the-counter Tums with little or no relief of symptoms  She does not use tobacco, alcohol  Drinks 1 cup of coffee and 1 cup of tea per day  Eats late at night, symptoms are worse at night  Denies previous history of liver or gallbladder disease  Has no history of H. pylori in the past has concerns with ongoing constipation but denies diarrhea  Dysmenorrhea/pelvic pain-complaining of pain during 1 or 2 days of the cycle and during the mid menstrual cycle located in the suprapubic and left lower quadrant abdomen, occurring cyclically for few years now  Periods are normal, lasting for 2 to 3 days of the cycle  Patient  is  2 para 2, denies previous history of ovarian cyst  Denies concerns for UTI symptoms, hematuria, history of recurrent UTI, nephrolithiasis    Patient has been advised of split billing requirements and indicates understanding: Yes  Healthy Habits:     Getting at least 3 servings of Calcium per day:  NO    Bi-annual eye exam:  Yes    Dental care twice a year:  Yes    Sleep apnea or symptoms of sleep apnea:  None    Diet:  Regular (no restrictions)    Frequency of exercise:  2-3 days/week    Duration of exercise:  30-45 minutes    Taking medications regularly:  Yes    Medication side effects:  None    PHQ-2 Total Score: 2    Additional concerns today:  No              Today's PHQ-2 Score:   PHQ-2 (  [employer]) 2022   Q1: Little interest or pleasure in doing things 1   Q2: Feeling down, depressed or hopeless 1   PHQ-2 Score 2   Q1: Little interest or pleasure in doing things Several days   Q2: Feeling down, depressed or hopeless Several days   PHQ-2 Score 2       Have you ever done Advance Care Planning? (For example, a Health Directive, POLST, or a discussion with a medical provider or your loved ones about your wishes): No, advance care planning information given to patient to review.  Patient declined advance care planning discussion at this time.    Social History     Tobacco Use     Smoking status: Never     Smokeless tobacco: Never   Substance Use Topics     Alcohol use: No     If you drink alcohol do you typically have >3 drinks per day or >7 drinks per week? No    Alcohol Use 2022   Prescreen: >3 drinks/day or >7 drinks/week? No   Prescreen: >3 drinks/day or >7 drinks/week? -   No flowsheet data found.    Reviewed orders with patient.  Reviewed health maintenance and updated orders accordingly - Yes  Lab work is in process  Labs reviewed in EPIC  BP Readings from Last 3 Encounters:   22 99/67   21 118/70   10/07/19 99/65    Wt Readings from Last 3 Encounters:   22 62.1 kg  (137 lb)   21 59.9 kg (132 lb)   10/04/19 69.9 kg (154 lb)                  Patient Active Problem List   Diagnosis     Indication for care in labor or delivery     History of  section      premature rupture of membranes in third trimester      delivery, delivered, current hospitalization     Vitamin D deficiency     Past Surgical History:   Procedure Laterality Date     APPENDECTOMY        SECTION        SECTION N/A 10/5/2019    Procedure:  SECTION;  Surgeon: Anika Goodwin MD;  Location:  [facility]       Social History     Tobacco Use     Smoking status: Never     Smokeless tobacco: Never   Substance Use Topics     Alcohol use: No     Family History   Problem Relation Age of Onset     Diabetes Father          Current Outpatient Medications   Medication Sig Dispense Refill     amoxicillin-clavulanate (AUGMENTIN) 875-125 MG tablet Take 1 tablet by mouth 2 times daily 20 tablet 0     fluticasone (FLONASE) 50 MCG/ACT nasal spray Spray 2 sprays into both nostrils daily 16 g 1     No Known Allergies  Recent Labs   Lab Test 11/15/21  1122   LDL 78   HDL 54   TRIG 61   ALT 17   CR 0.62   GFRESTIMATED >90   POTASSIUM 3.8   TSH 2.41        Breast Cancer Screening:    Breast CA Risk Assessment (FHS-7) 2021   Do you have a family history of breast, colon, or ovarian cancer? No / Unknown       click delete button to remove this line now  Patient under 40 years of age: Routine Mammogram Screening not recommended.   Pertinent mammograms are reviewed under the imaging tab.    History of abnormal Pap smear: NO - age 30-65 PAP every 5 years with negative HPV co-testing recommended  PAP / HPV Latest Ref Rng & Units 2021   PAP   Negative for Intraepithelial Lesion or Malignancy (NILM)   HPV16 Negative Negative   HPV18 Negative Negative   HRHPV Negative Negative     Reviewed and updated as needed this visit by clinical staff   Tobacco  Allergies  Meds               Reviewed and updated as needed this visit by Provider                   No past medical history on file.   Past Surgical History:   Procedure Laterality Date     APPENDECTOMY        SECTION        SECTION N/A 10/5/2019    Procedure:  SECTION;  Surgeon: Anika Goodwin MD;  Location:  [facility]     OB History    Para Term  AB Living   2 2 1 1 0 2   SAB IAB Ectopic Multiple Live Births   0 0 0 0 2      # Outcome Date GA Lbr Augustine/2nd Weight Sex Delivery Anes PTL Lv   2  10/05/19 36w4d  2.505 kg (5 lb 8.4 oz) F CS-LTranv Spinal Y DUYEN      Complications: Failure to Progress in First Stage      Name: ANDRY KATE-ELLIS      Apgar1: 8  Apgar5: 9   1 Term 16    F -SEC   DUYEN      Complications: Face presentation of fetus       Review of Systems   Constitutional: Negative for chills and fever.   HENT: Positive for congestion and sore throat. Negative for ear pain and hearing loss.    Eyes: Negative for pain and visual disturbance.   Respiratory: Positive for cough. Negative for shortness of breath.    Cardiovascular: Negative for chest pain, palpitations and peripheral edema.   Gastrointestinal: Negative for abdominal pain, constipation, diarrhea, heartburn and hematochezia.   Breasts:  Negative for tenderness, breast mass and discharge.   Genitourinary: Negative for dysuria, frequency, genital sores, hematuria, pelvic pain, urgency, vaginal bleeding and vaginal discharge.   Musculoskeletal: Negative for arthralgias, joint swelling and myalgias.   Skin: Negative for rash.   Neurological: Positive for weakness and headaches. Negative for dizziness and paresthesias.   Psychiatric/Behavioral: Negative for mood changes. The patient is not nervous/anxious.      CONSTITUTIONAL: NEGATIVE for fever, chills, change in weight  INTEGUMENTARU/SKIN: NEGATIVE for worrisome rashes, moles or lesions  EYES: NEGATIVE for vision changes or irritation  ENT: as  "above  RESP:as above  BREAST: NEGATIVE for masses, tenderness or discharge  CV: NEGATIVE for chest pain, palpitations or peripheral edema  GI: as above   female: as above  MUSCULOSKELETAL: NEGATIVE for significant arthralgias or myalgia  NEURO: NEGATIVE for weakness, dizziness or paresthesias  ENDOCRINE: NEGATIVE for temperature intolerance, skin/hair changes  HEME/ALLERGY/IMMUNE: NEGATIVE for bleeding problems  PSYCHIATRIC: NEGATIVE for changes in mood or affect     OBJECTIVE:   BP 99/67   Pulse 82   Temp 97.9  F (36.6  C) (Oral)   Resp 16   Ht 1.55 m (5' 1.02\")   Wt 62.1 kg (137 lb)   LMP 11/24/2022   SpO2 97%   BMI 25.87 kg/m    Physical Exam  GENERAL: healthy, alert and no distress  EYES: Eyes grossly normal to inspection, PERRL and conjunctivae and sclerae normal  HENT: normal cephalic/atraumatic, both ears: mucopurulent effusion, nasal mucosa edematous , rhinorrhea yellow and green, oropharynx clear, oral mucous membranes moist and sinuses: maxillary, frontal, ethmoid tenderness on both sides  NECK: no adenopathy, no asymmetry, masses, or scars and thyroid normal to palpation  RESP: lungs clear to auscultation - no rales, rhonchi or wheezes  BREAST: normal without masses, tenderness or nipple discharge and no palpable axillary masses or adenopathy  CV: regular rate and rhythm, normal S1 S2, no S3 or S4, no murmur, click or rub, no peripheral edema and peripheral pulses strong  ABDOMEN: soft, to moderate epigastric tenderness with no guarding or rigidity, no organomegaly, normal BS, no costovertebral angle tenderness  Minimal tenderness in the left lower quadrant of abdomen  MS: no gross musculoskeletal defects noted, no edema  SKIN: no suspicious lesions or rashes  NEURO: Normal strength and tone, mentation intact and speech normal  PSYCH: mentation appears normal, affect normal/bright    Diagnostic Test Results:  Labs reviewed in Epic    ASSESSMENT/PLAN:   (Z00.00) Routine general medical " examination at a health care facility  (primary encounter diagnosis)  Comment:   Plan: Discussed on regular exercises, healthy eating, self breast exams monthly and routine dental checks.      (J01.90) Acute sinusitis with symptoms greater than 10 days  Comment:   Plan: amoxicillin-clavulanate (AUGMENTIN) 875-125 MG         tablet        Dosing and potential medication side effects discussed.  Recommended to start on Augmentin twice a day for 10 days  Recommended to start using Flonase nasal sprays daily along with saline nasal sprays 3-4 times a day  If no improvement noted in 2 weeks, patient will follow-up in the clinic for further evaluation  Will consider sinus CT due to severity of symptoms and chronicity  Patient verbalised understanding and is agreeable to the plan.      (R10.2) Pelvic pain in female  Comment: ddx- ?  Ovarian cyst/screen for UTI/?  Plan: UA with Microscopic reflex to Culture - lab         collect, Urine Microscopic        Will f/u on results and call with recommendations.  If test results are negative, consider getting a pelvic scan to exclude ovarian cyst as a possible reason  Patient verbalised understanding and is agreeable to the plan.      (R10.13) Abdominal pain, epigastric  Comment:   Plan: ddx-dyspepsia/gastritis/H. pylori infection/liver or gallbladder pathology  Reviewed the possible etiologies of epigastric pain, gave education handouts on the same.  Emphasized on reflux precautions including avoiding or limiting caffeinated drinks  Advised not to eat or drink anything at least 2 to 3 hours before going to bedtime we will give a trial of over-the-counter Prilosec 20 mg once a day for the next 4 weeks,  Follow for recheck in 4 weeks or sooner if needed  Will consider further evaluation  for persistent or worsening concerns  Patient verbalised understanding and is agreeable to the plan.      (R09.81) Nasal congestion  Comment:   Plan: fluticasone (FLONASE) 50 MCG/ACT nasal spray         Start on Flonase nasal sprays as mentioned above    (Z13.0) Screening for deficiency anemia  Comment:   Plan: CBC with platelets    (Z13.1) Screening for diabetes mellitus (DM)  Comment:   Plan: CMP    (Z13.220) Screening for hyperlipidemia  Comment:   Plan: Lipid panel    (E55.9) Vitamin D deficiency  Comment: Not taking any vitamin D supplements at this time  Plan: Vitamin D deficiency    (R82.90) Abnormal finding on urinalysis  Comment:   Plan: Urine Culture Aerobic Bacterial - lab collect        Will f/u on results and call with recommendations.              COUNSELING:  Reviewed preventive health counseling, as reflected in patient instructions  Special attention given to:        Regular exercise       Healthy diet/nutrition       Vision screening       Immunizations    Declined: Covid-19 and Hepatitis B due to Other patient wants to come at a later date               Contraception       Family planning        She reports that she has never smoked. She has never used smokeless tobacco.    Chart documentation done in part with Dragon Voice recognition Software. Although reviewed after completion, some word and grammatical error may remain.    .  Nallely Persaud MD  [facility]

## 2022-12-02 LAB — BACTERIA UR CULT: NORMAL

## 2022-12-02 NOTE — RESULT ENCOUNTER NOTE
Matthew Mcguire,  Your recent labs showed normal hemoglobin with no concern for anemia, excellent fasting blood sugar and cholesterol, normal liver and kidney functions.  Your vitamin D is in the low normal range, you will benefit from taking over-the-counter vitamin D 2000 units daily  Your urine showed no concerns for infection.  As we discussed, I would recommend to proceed with getting a pelvic ultrasound for further evaluation of your cyclical intermittent pain  You can call  to schedule for the ultrasound.   Let me know if you have any questions. Take care.  Nallely Persaud MD

## 2022-12-11 ENCOUNTER — MYC MEDICAL ADVICE (OUTPATIENT)
Dept: FAMILY MEDICINE | Facility: CLINIC | Age: 37
End: 2022-12-11

## 2022-12-12 NOTE — TELEPHONE ENCOUNTER
See [employer] message/attachement.     Routing to provider to review and advise.     JEFF Paulino  [facility]

## 2022-12-13 ENCOUNTER — MYC MEDICAL ADVICE (OUTPATIENT)
Dept: FAMILY MEDICINE | Facility: CLINIC | Age: 37
End: 2022-12-13

## 2022-12-13 NOTE — TELEPHONE ENCOUNTER
Pt is unable to make it to her appt 12/20. Appt was cancelled and now she is wanting to see if you can see her sooner than your first available opening. She states this is to f/u on heart burn as well as cold sx. Is it ok to use same day some where? Please advise.  Donna Calix CMA

## 2022-12-31 ENCOUNTER — TRANSFERRED RECORDS (OUTPATIENT)
Dept: FAMILY MEDICINE | Facility: CLINIC | Age: 37
End: 2022-12-31

## 2023-01-09 ENCOUNTER — MYC MEDICAL ADVICE (OUTPATIENT)
Dept: FAMILY MEDICINE | Facility: CLINIC | Age: 38
End: 2023-01-09

## 2023-01-09 NOTE — TELEPHONE ENCOUNTER
Please update patient  Ultrasound was ordered at the time of physical last November  She needs to call the [facility] to set up the appointment for the scan

## 2023-01-09 NOTE — TELEPHONE ENCOUNTER
Patient requesting US for her abd pain. She explains that this was discussed at her last visit.  Please see basestone message.     /Clari MOSQUEDA RN, BSN   [facility] [place]

## 2023-01-24 ENCOUNTER — ANCILLARY PROCEDURE (OUTPATIENT)
Dept: ULTRASOUND IMAGING | Facility: CLINIC | Age: 38
End: 2023-01-24
Attending: FAMILY MEDICINE
Payer: COMMERCIAL

## 2023-01-24 DIAGNOSIS — R10.2 PELVIC PAIN IN FEMALE: ICD-10-CM

## 2023-01-24 PROCEDURE — 76856 US EXAM PELVIC COMPLETE: CPT

## 2023-01-25 NOTE — RESULT ENCOUNTER NOTE
Matthew Mcguire,  Your pelvic scan showed normal uterus and ovaries with no concerns that would explain your symptoms.  If taking over the counter Naproxen or Ibuprofen during the cyclical pain does not relieve the pain, we will consider consulting the gynecologist for further evaluation.   Let me know if you have any questions. Take care.  Nallely Persaud MD

## 2023-07-18 ENCOUNTER — NURSE TRIAGE (OUTPATIENT)
Dept: FAMILY MEDICINE | Facility: CLINIC | Age: 38
End: 2023-07-18
Payer: COMMERCIAL

## 2023-07-18 NOTE — TELEPHONE ENCOUNTER
"Nurse Triage SBAR    Is this a 2nd Level Triage? NO    Situation: See [employer] message 7/17/2023. Patient calling back regarding abdominal pain.    Background: Abdominal pain present in the past during second pregnancy and when ovulating. Completed pelvic scan previously as well. Patient reports abdominal pain occurring for the past month    Assessment: Abdominal pain located beneath ribs. Does not radiate. Pain is about 4/10, and at its worse a 6/10. Patient comes and goes daily, but not so severe that it limits her daily activity. Patient does take Tums every and then due to having history of GERD. Patient reports initially she did feel bloated but wasn't sure if it was because she had eaten a lot during that time for family functions. She does report having constipation and feeling gassy.     Patient denies any fever, nausea, emesis, breathing difficulty, blood in stool or urine, or dizziness. No chance of pregnancy. Last menstrual cycle two weeks ago.    Protocol Recommended Disposition:   See in Office Within 2 Weeks    Recommendation: RN assisted in scheduling follow-up visit with PCP for further evaluation. Appointment scheduled for  7/25. Patient understands to be seen sooner in UC or ED for new or worsening symptoms. No further questions or concerns.    JEFF Carver  [facility] Primary Care Triage      Reason for Disposition    Abdominal pain is a chronic symptom (recurrent or ongoing AND lasting > 4 weeks)    Answer Assessment - Initial Assessment Questions  1. LOCATION: \"Where does it hurt?\"       Pain underneath ribs  2. RADIATION: \"Does the pain shoot anywhere else?\" (e.g., chest, back)      No  3. ONSET: \"When did the pain begin?\" (e.g., minutes, hours or days ago)       Use to have before second pregnancy and during ovulation time. Past month happening, every day  4. SUDDEN: \"Gradual or sudden onset?\"      Gradual  5. PATTERN \"Does the pain come and go, or is it constant?\"     - If constant: \"Is " "it getting better, staying the same, or worsening?\"       (Note: Constant means the pain never goes away completely; most serious pain is constant and it progresses)      - If intermittent: \"How long does it last?\" \"Do you have pain now?\"      (Note: Intermittent means the pain goes away completely between bouts)      Comes and goes  6. SEVERITY: \"How bad is the pain?\"  (e.g., Scale 1-10; mild, moderate, or severe)     - MILD (1-3): doesn't interfere with normal activities, abdomen soft and not tender to touch      - MODERATE (4-7): interferes with normal activities or awakens from sleep, abdomen tender to touch      - SEVERE (8-10): excruciating pain, doubled over, unable to do any normal activities        4/10 currently, sometimes to 6/10. Can do regular activities but always there  7. RECURRENT SYMPTOM: \"Have you ever had this type of stomach pain before?\" If Yes, ask: \"When was the last time?\" and \"What happened that time?\"       Yes has had it previously  8. AGGRAVATING FACTORS: \"Does anything seem to cause this pain?\" (e.g., foods, stress, alcohol)      Unsure  9. CARDIAC SYMPTOMS: \"Do you have any of the following symptoms: chest pain, difficulty breathing, sweating, nausea?\"      No  10. OTHER SYMPTOMS: \"Do you have any other symptoms?\" (e.g., back pain, diarrhea, fever, urination pain, vomiting)        Bloating, no nausea or emesis, lots of constipation, gassy  11. PREGNANCY: \"Is there any chance you are pregnant?\" \"When was your last menstrual period?\"        No, last period two weeks ago    Protocols used: ABDOMINAL PAIN - UPPER-A-OH      "

## 2023-07-25 ENCOUNTER — OFFICE VISIT (OUTPATIENT)
Dept: FAMILY MEDICINE | Facility: CLINIC | Age: 38
End: 2023-07-25
Payer: COMMERCIAL

## 2023-07-25 VITALS
HEIGHT: 61 IN | BODY MASS INDEX: 26.19 KG/M2 | DIASTOLIC BLOOD PRESSURE: 62 MMHG | HEART RATE: 68 BPM | WEIGHT: 138.7 LBS | TEMPERATURE: 98.6 F | OXYGEN SATURATION: 98 % | SYSTOLIC BLOOD PRESSURE: 97 MMHG | RESPIRATION RATE: 12 BRPM

## 2023-07-25 DIAGNOSIS — K59.00 CONSTIPATION, UNSPECIFIED CONSTIPATION TYPE: ICD-10-CM

## 2023-07-25 DIAGNOSIS — R10.12 LUQ ABDOMINAL PAIN: Primary | ICD-10-CM

## 2023-07-25 DIAGNOSIS — R31.29 MICROSCOPIC HEMATURIA: ICD-10-CM

## 2023-07-25 DIAGNOSIS — R10.13 DYSPEPSIA: ICD-10-CM

## 2023-07-25 LAB
ALBUMIN UR-MCNC: NEGATIVE MG/DL
APPEARANCE UR: CLEAR
BACTERIA #/AREA URNS HPF: ABNORMAL /HPF
BASOPHILS # BLD AUTO: 0 10E3/UL (ref 0–0.2)
BASOPHILS NFR BLD AUTO: 0 %
BILIRUB UR QL STRIP: NEGATIVE
COLOR UR AUTO: YELLOW
EOSINOPHIL # BLD AUTO: 0.2 10E3/UL (ref 0–0.7)
EOSINOPHIL NFR BLD AUTO: 3 %
ERYTHROCYTE [DISTWIDTH] IN BLOOD BY AUTOMATED COUNT: 11.9 % (ref 10–15)
GLUCOSE UR STRIP-MCNC: NEGATIVE MG/DL
HCT VFR BLD AUTO: 36.5 % (ref 35–47)
HGB BLD-MCNC: 12.2 G/DL (ref 11.7–15.7)
HGB UR QL STRIP: ABNORMAL
IMM GRANULOCYTES # BLD: 0 10E3/UL
IMM GRANULOCYTES NFR BLD: 0 %
KETONES UR STRIP-MCNC: NEGATIVE MG/DL
LEUKOCYTE ESTERASE UR QL STRIP: NEGATIVE
LYMPHOCYTES # BLD AUTO: 2.2 10E3/UL (ref 0.8–5.3)
LYMPHOCYTES NFR BLD AUTO: 33 %
MCH RBC QN AUTO: 30.4 PG (ref 26.5–33)
MCHC RBC AUTO-ENTMCNC: 33.4 G/DL (ref 31.5–36.5)
MCV RBC AUTO: 91 FL (ref 78–100)
MONOCYTES # BLD AUTO: 0.5 10E3/UL (ref 0–1.3)
MONOCYTES NFR BLD AUTO: 8 %
NEUTROPHILS # BLD AUTO: 3.9 10E3/UL (ref 1.6–8.3)
NEUTROPHILS NFR BLD AUTO: 56 %
NITRATE UR QL: NEGATIVE
PH UR STRIP: 7 [PH] (ref 5–7)
PLATELET # BLD AUTO: 193 10E3/UL (ref 150–450)
RBC # BLD AUTO: 4.01 10E6/UL (ref 3.8–5.2)
RBC #/AREA URNS AUTO: ABNORMAL /HPF
SP GR UR STRIP: 1.01 (ref 1–1.03)
SQUAMOUS #/AREA URNS AUTO: ABNORMAL /LPF
UROBILINOGEN UR STRIP-ACNC: 0.2 E.U./DL
WBC # BLD AUTO: 6.9 10E3/UL (ref 4–11)
WBC #/AREA URNS AUTO: ABNORMAL /HPF

## 2023-07-25 PROCEDURE — 85025 COMPLETE CBC W/AUTO DIFF WBC: CPT | Performed by: FAMILY MEDICINE

## 2023-07-25 PROCEDURE — 81001 URINALYSIS AUTO W/SCOPE: CPT | Performed by: FAMILY MEDICINE

## 2023-07-25 PROCEDURE — 99214 OFFICE O/P EST MOD 30 MIN: CPT | Performed by: FAMILY MEDICINE

## 2023-07-25 PROCEDURE — 36415 COLL VENOUS BLD VENIPUNCTURE: CPT | Performed by: FAMILY MEDICINE

## 2023-07-25 RX ORDER — POLYETHYLENE GLYCOL 3350 17 G/17G
1 POWDER, FOR SOLUTION ORAL DAILY
Qty: 510 G | Refills: 0 | Status: SHIPPED | OUTPATIENT
Start: 2023-07-25 | End: 2024-01-09

## 2023-07-25 ASSESSMENT — PAIN SCALES - GENERAL: PAINLEVEL: SEVERE PAIN (6)

## 2023-07-25 NOTE — PROGRESS NOTES
"  Assessment & Plan     LUQ abdominal pain  Ddx-dyspepsia/H. pylori infection/rule out UTI/constipation  Treat underlying constipation as mentioned below  We will get the test done to evaluate for UTI and H. pylori infection  After submitting the stool sample, patient will start taking omeprazole 20 mg once daily  Follow-up if no improvement noted in 4 weeks or sooner if needed  - CBC with platelets and differential; Future  - UA with Microscopic reflex to Culture - lab collect; Future  - Helicobacter pylori Antigen Stool; Future  - CBC with platelets and differential  - UA with Microscopic reflex to Culture - lab collect  - Helicobacter pylori Antigen Stool    Constipation, unspecified constipation type  Avoid triggering foods, eat high-fiber diet, push fluids, start taking MiraLAX once daily  - polyethylene glycol (MIRALAX) 17 GM/Dose powder; Take 17 g (1 Capful) by mouth daily    Dyspepsia  We will get H. pylori testing after which she will start on Prilosec 20 mg once daily  If no improvement noted and if H. pylori testing is negative, will consider upper GI endoscopy for further evaluation  Start writing a food diary and follow elimination diet if possible  - Helicobacter pylori Antigen Stool; Future  - omeprazole (PRILOSEC) 20 MG DR capsule; Take 1 capsule (20 mg) by mouth daily  - Helicobacter pylori Antigen Stool    Review of the result(s) of each unique test - CBC, urine exam    BMI:   Estimated body mass index is 26.21 kg/m  as calculated from the following:    Height as of this encounter: 1.549 m (5' 1\").    Weight as of this encounter: 62.9 kg (138 lb 11.2 oz).   Weight management plan: Patient is currently in intermittent fasting to help with weight loss    Chart documentation done in part with Dragon Voice recognition Software. Although reviewed after completion, some word and grammatical error may remain.    See Patient Instructions    Nallely Persaud MD  [facility] [place] [place]    Subjective   Shayla is a 38 year old, presenting for the following health issues:  Abdominal Pain (Upper left abdomen pain x 3wks )  Patient is here with concerns of having pain in the left upper quadrant, and epigastric region for the past 3 weeks with no associated concerns for fever, chills, UTI symptoms, hematuria, history of recurrent UTI, nephrolithiasis, diarrhea, melena, hematemesis  Has ongoing concerns with abdominal bloating, mild acid reflux intermittently based on her diet and ongoing constipation patient gets BM once daily but she gets formed and hard by BM  Denies blood in stool, rectal bleeding  Patient reported that she has been doing intermittent fasting to help with weight loss for the past 2 weeks that seems to have improved her GERD symptoms  Patient is  2 para 2  LMP-2023  Denies abnormal vaginal discharge, current pelvic pain  She had upper GI endoscopy done in  for her upper GI symptoms at that time  Has no concern for dysphagia  Has no current NSAID use, patient does not smoke, does not drink alcohol  Other HPI as mentioned below         No data to display              History of Present Illness       Reason for visit:  Abdominal pain    She eats 0-1 servings of fruits and vegetables daily.She consumes 0 sweetened beverage(s) daily.She exercises with enough effort to increase her heart rate 20 to 29 minutes per day.  She exercises with enough effort to increase her heart rate 3 or less days per week.   She is taking medications regularly.       Pain History:  When did you first notice your pain? The first week of July   Have you seen anyone else for your pain? No  How has your pain affected your ability to work? 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 Abdominal/Flank Pain  Onset/Duration: 3 weeks   Description:   Character: Dull ache, Burning, and Cramping  Location:  "left upper quadrant  Radiation: None  Intensity: mild, severe, 6/10  Progression of Symptoms:  n/a  Accompanying Signs & Symptoms:  Fever/Chills: No  Gas/Bloating: YES  Nausea: No  Vomitting: No  Diarrhea: No  Constipation: YES  Dysuria or Hematuria: No  History:   Trauma: No  Previous similar pain: YES  Previous tests done: none  Precipitating factors:   Does the pain change with:     Food: tomatoes sauce     Bowel Movement: YES    Urination: YES   Other factors:  No  Therapies tried and outcome: None  Patient's last menstrual period was 07/06/2023 (exact date).            Review of Systems   CONSTITUTIONAL: NEGATIVE for fever, chills, change in weight  RESP: NEGATIVE for significant cough or SOB  CV: NEGATIVE for chest pain, palpitations or peripheral edema  GI: as above  : Negative for UTI symptoms  PSYCHIATRIC: NEGATIVE for changes in mood or affect      Objective    BP 97/62 (BP Location: Right arm, Patient Position: Sitting, Cuff Size: Adult Regular)   Pulse 68   Temp 98.6  F (37  C) (Oral)   Resp 12   Ht 1.549 m (5' 1\")   Wt 62.9 kg (138 lb 11.2 oz)   LMP 07/06/2023 (Exact Date)   SpO2 98%   BMI 26.21 kg/m    Body mass index is 26.21 kg/m .  Physical Exam   GENERAL: healthy, alert and no distress  RESP: lungs clear to auscultation - no rales, rhonchi or wheezes  CV: regular rate and rhythm, normal S1 S2, no S3 or S4, no murmur, click or rub, no peripheral edema and peripheral pulses strong  ABDOMEN: soft, minimal -moderate tenderness in the epigastrium and left upper quadrant with no guarding or rigidity, no organomegaly, normal BS, no costovertebral angle tenderness  BACK: no CVA tenderness, no paralumbar tenderness  PSYCH: mentation appears normal, affect normal/bright    Labs in process                  "

## 2023-07-26 ENCOUNTER — APPOINTMENT (OUTPATIENT)
Dept: LAB | Facility: CLINIC | Age: 38
End: 2023-07-26
Payer: COMMERCIAL

## 2023-07-26 ENCOUNTER — TELEPHONE (OUTPATIENT)
Dept: FAMILY MEDICINE | Facility: CLINIC | Age: 38
End: 2023-07-26

## 2023-07-26 PROCEDURE — 87338 HPYLORI STOOL AG IA: CPT | Performed by: FAMILY MEDICINE

## 2023-07-26 NOTE — TELEPHONE ENCOUNTER
Patient calling about result of stool sample. RN relayed lab is still in process and no results available at this time since it was stool sample was just received this morning.     Patient had questions about when she can start taking Omeprazole and Miralax. RN reviewed and relayed provider treatment plan notes below; start omeprazole daily after submitting stool sample and start taking Miralax daily. Patient verbalized good understanding. No further questions or concerns.          JEFF Carver  [facility] Primary Care Triage

## 2023-07-26 NOTE — RESULT ENCOUNTER NOTE
Matthew Mcguire,  Your recent labs showed normal hemoglobin and blood counts  Urine exam does not show sign for infection but small amount of blood  In the absence of current menses, this could be suggestive of possible urinary stone  I would recommend to get a repeat urine exam in a week to see the trend  If you continue to have blood in the urine, I would suggest CT scan to screen for kidney stones   Let me know if you have any questions. Take care..  Nallely Persaud MD

## 2023-07-27 LAB — H PYLORI AG STL QL IA: NEGATIVE

## 2023-07-27 NOTE — RESULT ENCOUNTER NOTE
Matthew Mcguire,  Your stool H. pylori is negative, this is good  As we discussed, I recommended a trial for over-the-counter omeprazole 20 mg once daily for the next 4 weeks  If you continue to have symptoms, please follow-up for further evaluation.   Let me know if you have any questions. Take care.  Nallely Persaud MD

## 2023-07-31 ENCOUNTER — TELEPHONE (OUTPATIENT)
Dept: GASTROENTEROLOGY | Facility: CLINIC | Age: 38
End: 2023-07-31
Payer: COMMERCIAL

## 2023-07-31 ENCOUNTER — TELEPHONE (OUTPATIENT)
Dept: FAMILY MEDICINE | Facility: CLINIC | Age: 38
End: 2023-07-31
Payer: COMMERCIAL

## 2023-07-31 DIAGNOSIS — R31.29 MICROSCOPIC HEMATURIA: ICD-10-CM

## 2023-07-31 DIAGNOSIS — R10.12 LUQ ABDOMINAL PAIN: ICD-10-CM

## 2023-07-31 DIAGNOSIS — R10.13 ABDOMINAL PAIN, EPIGASTRIC: ICD-10-CM

## 2023-07-31 DIAGNOSIS — R10.13 DYSPEPSIA: Primary | ICD-10-CM

## 2023-07-31 NOTE — TELEPHONE ENCOUNTER
CT abdomen and upper GI endoscopy ordered  Please inform patient to call the [facility] to set up the appointment

## 2023-07-31 NOTE — TELEPHONE ENCOUNTER
Caller: Patient  Reason for Reschedule/Cancellation (please be detailed, any staff messages or encounters to note?): Called to rescheduled to sooner appointment that was originally offered.      Prior to reschedule please review:  Ordering Provider: Nallely Persaud MD   Sedation per order: Moderate  Does patient have any ASC Exclusions, please identify?: N      Notes on Cancelled Procedure:  Procedure: Upper Endoscopy [EGD]   Date: 09/25  Location: [facility]; [address] [place] [address]  Surgeon: Edward      Rescheduled: Yes  Procedure: Upper Endoscopy [EGD]   Date: 08/03  Location: [facility]; [address] [place] [address]  Surgeon: Marisa  Sedation Level Scheduled  Moderate,  Reason for Sedation Level Order  Prep/Instructions updated and sent: Natty

## 2023-07-31 NOTE — TELEPHONE ENCOUNTER
"Endoscopy Scheduling Screen    Have you had a positive Covid test in the last 14 days?  No    Are you active on MyChart?   Yes    What insurance is in the chart?  Other:  [facility] [employer]    Ordering/Referring Provider: RADHA MELENDEZ   (If ordering provider performs procedure, schedule with ordering provider unless otherwise instructed. )    BMI: Estimated body mass index is 26.21 kg/m  as calculated from the following:    Height as of 7/25/23: 1.549 m (5' 1\").    Weight as of 7/25/23: 62.9 kg (138 lb 11.2 oz).       Sedation Ordered  moderate sedation.   If patient BMI > 50 do not schedule in ASC.    Are you taking any prescription medications for pain?   No    Are you taking methadone or Suboxone?  No      Do you have a history of malignant hyperthermia or adverse reaction to anesthesia?  No      (Females) Are you currently pregnant?   No       Have you been diagnosed or told you have pulmonary hypertension?   No      Do you have an LVAD?  No      Have you been told you have moderate to severe sleep apnea?  No      Have you been told you have COPD, asthma, or any other lung disease?  No      Do you have any heart conditions?  No       Have you ever had or are you awaiting a heart or lung transplant?   No      Have you had a stroke or transient ischemic attack (TIA aka \"mini stroke\" in the last 6 months?   No      Have you been diagnosed with or been told you have cirrhosis of the liver?   No      Are you currently on dialysis?   No      Do you need assistance transferring?   No    BMI: Estimated body mass index is 26.21 kg/m  as calculated from the following:    Height as of 7/25/23: 1.549 m (5' 1\").    Weight as of 7/25/23: 62.9 kg (138 lb 11.2 oz).     Is patients BMI > 40 and scheduling location UPU?  No      Do you take the medication Phentermine, Ozempic or Wegovy?  No      Do you take the medication Naltrexone?  No      Do you take blood thinners?  No      Prep   Are you currently on dialysis or do you have " chronic kidney disease?  No      Do you have a diagnosis of diabetes?  No      Do you have a diagnosis of cystic fibrosis (CF)?  No      On a regular basis do you go 3 -5 days between bowel movements?        BMI > 40?  No    Preferred Pharmacy:    [id number] - [place] - [address]   [address] [place] [address]  Phone: [phone] Fax: [phone]      Final Scheduling Details   Colonoscopy prep sent?  N/A    Procedure scheduled  Upper endoscopy (EGD)    Surgeon:  SIENNA     Date of procedure:  9/25     Schedule PAC:   No    Location  [facility]    Sedation   Moderate Sedation    Patient Reminders:   You will receive a call from a Nurse to review instructions and health history.  This assessment must be completed prior to your procedure.  Failure to complete the Nurse assessment may result in the procedure being cancelled.      On the day of your procedure, please designate an adult(s) who can drive you home stay with you for the next 24 hours. The medicines used in the exam will make you sleepy. You will not be able to drive.      You cannot take public transportation, ride share services, or non-medical taxi service without a responsible caregiver.  Medical transport services are allowed with the requirement that a responsible caregiver will receive you at your destination.  We require that drivers and caregivers are confirmed prior to your procedure.

## 2023-07-31 NOTE — TELEPHONE ENCOUNTER
RN called patient and relayed provider message below. Patient verbalized good understanding and will call to schedule appointments. No further questions or concerns.    JEFF Carver  [facility] Primary Care Triage

## 2023-07-31 NOTE — TELEPHONE ENCOUNTER
Patient states she would like to have Dr. Persaud order upper endoscopy and kidney US. Has not had any changes in her symptoms and wants to proceed with testing that was discussed at the visit.     Gloria Murillo RN    [facility]- Primary Care

## 2023-08-02 ENCOUNTER — ANCILLARY PROCEDURE (OUTPATIENT)
Dept: CT IMAGING | Facility: CLINIC | Age: 38
End: 2023-08-02
Attending: FAMILY MEDICINE
Payer: COMMERCIAL

## 2023-08-02 DIAGNOSIS — R10.12 LUQ ABDOMINAL PAIN: ICD-10-CM

## 2023-08-02 DIAGNOSIS — R31.29 MICROSCOPIC HEMATURIA: ICD-10-CM

## 2023-08-02 PROCEDURE — 74176 CT ABD & PELVIS W/O CONTRAST: CPT

## 2023-08-02 NOTE — RESULT ENCOUNTER NOTE
Matthew Mcguire,  Your CT showed no concerns for kidney stones or other kidney problems that would cause blood in the urine which is reassuring  It incidentally showed a 3.4 cm cyst on the right ovary.  Given your pain on the left side of the lower abdomen, this may not be the reason for your pain.  This does not need any follow-up at this time unless indicated   Let me know if you have any questions. Take care.  Nallely Persaud MD

## 2023-08-03 ENCOUNTER — HOSPITAL ENCOUNTER (OUTPATIENT)
Facility: CLINIC | Age: 38
Discharge: HOME | End: 2023-08-03
Attending: INTERNAL MEDICINE | Admitting: INTERNAL MEDICINE
Payer: COMMERCIAL

## 2023-08-03 ENCOUNTER — TELEPHONE (OUTPATIENT)
Dept: FAMILY MEDICINE | Facility: CLINIC | Age: 38
End: 2023-08-03
Payer: COMMERCIAL

## 2023-08-03 VITALS
OXYGEN SATURATION: 98 % | HEIGHT: 61 IN | HEART RATE: 70 BPM | SYSTOLIC BLOOD PRESSURE: 100 MMHG | WEIGHT: 138 LBS | BODY MASS INDEX: 26.06 KG/M2 | RESPIRATION RATE: 13 BRPM | DIASTOLIC BLOOD PRESSURE: 66 MMHG

## 2023-08-03 LAB — UPPER GI ENDOSCOPY: NORMAL

## 2023-08-03 PROCEDURE — 999N000099 HC STATISTIC MODERATE SEDATION < 10 MIN: Performed by: INTERNAL MEDICINE

## 2023-08-03 PROCEDURE — 250N000011 HC RX IP 250 OP 636: Performed by: INTERNAL MEDICINE

## 2023-08-03 PROCEDURE — 43239 EGD BIOPSY SINGLE/MULTIPLE: CPT | Performed by: INTERNAL MEDICINE

## 2023-08-03 PROCEDURE — 88305 TISSUE EXAM BY PATHOLOGIST: CPT | Mod: TC | Performed by: INTERNAL MEDICINE

## 2023-08-03 RX ORDER — FENTANYL CITRATE 50 UG/ML
INJECTION, SOLUTION INTRAMUSCULAR; INTRAVENOUS PRN
Status: DISCONTINUED | OUTPATIENT
Start: 2023-08-03 | End: 2023-08-03 | Stop reason: HOSPADM

## 2023-08-03 ASSESSMENT — ACTIVITIES OF DAILY LIVING (ADL): ADLS_ACUITY_SCORE: 35

## 2023-08-03 NOTE — TELEPHONE ENCOUNTER
Patient states she had endoscopy procedure and results today, patient is wondering what results mean and what next steps are.     Writer relayed recommendation note below from procedure note.    Advise will send message to PCP for further advisement and can update patient once provider responds. Patient request if unable to reach patient on phone to send a [employer] message.     JEFF Paulino  [facility]

## 2023-08-03 NOTE — TELEPHONE ENCOUNTER
Called patient and notified her of provider's response below. She verbalized understanding & had no further questions/concerns at this time.     Sherrell Damian, BISHNUN, RN   [facility] Primary [facility]

## 2023-08-04 LAB
PATH REPORT.COMMENTS IMP SPEC: NORMAL
PATH REPORT.COMMENTS IMP SPEC: NORMAL
PATH REPORT.FINAL DX SPEC: NORMAL
PATH REPORT.GROSS SPEC: NORMAL
PATH REPORT.MICROSCOPIC SPEC OTHER STN: NORMAL
PATH REPORT.RELEVANT HX SPEC: NORMAL
PHOTO IMAGE: NORMAL

## 2023-08-04 PROCEDURE — 88305 TISSUE EXAM BY PATHOLOGIST: CPT | Mod: 26 | Performed by: PATHOLOGY

## 2023-08-04 NOTE — TELEPHONE ENCOUNTER
Reviewed upper GI endoscopy from yesterday and noted results from the provider as mentioned below  Impression:               - Normal esophagus.                             - Erythematous mucosa in the gastric body and                             antrum. Biopsied.                             - Normal duodenal bulb, first portion of the                             duodenum and second portion of the duodenum.   Recommendation:           - Please continue to follow with Primary care                             Physician.                             - I will contact patient with Biopsy result in 1-2                             weeks. Please contact our Office at  at                             [facility] if ther is any questions,                             - Anti Relux Precautions                             - Continue present medications.       Looks like patient will get the message from the GI provider after the biopsy results come back  So for the scope shows erythematous mucosa as if in gastritis-will continue to avoid potential triggers including caffeine, NSAIDs, offending foods, sour and citric foods  Patient will give a trial for over-the-counter Prilosec 20 mg once daily and she was given on 7/25/2023  Also please inform the patient that H. pylori stool can become negative, but still could be positive, the reason why the GI provider does it through the endoscopy whenever they do that.  I do not see the endoscopy results in my basket yet

## 2023-08-04 NOTE — TELEPHONE ENCOUNTER
Called patient back and notified her of provider's recommendations. Patient verbalized understanding.     Patient was wondering if she continues to have abdominal pain despite recommendations given by provider, patient is wondering if she is okay to take Tylenol to help with pain.     Routing to provider to review and advise.    BISHNU SheltonN, RN   [facility] Primary Care [facility]

## 2023-08-04 NOTE — TELEPHONE ENCOUNTER
Patient calling the clinic to find out if Dr. Persaud was able to review the endoscopy results from 8/3/23. RN notes the documentation below. Patient wondering if this result note will be through [employer]?     She is also wondering why they did a biopsy for H. Pylori yesterday when her stool sample came back negative. RN notes results from 7/26/23 below:    Component Ref Range & Units 9 d ago     Helicobacter pylori Antigen Stool Negative Negative     Routing to provider to review and advise.    Ceci Burgess, BISHNUN, RN  [facility]

## 2023-08-07 NOTE — TELEPHONE ENCOUNTER
RN called patient and relayed provider message below. Patient verbalized good understanding. No further questions or concerns.    JEFF Carver  [facility] Primary Care Triage

## 2023-08-07 NOTE — TELEPHONE ENCOUNTER
Please ask her to follow up if her abdominal pain continues after the 4 weeks of prilosec.  Tylenol does not help much unless increases pain of musculoskeletal origin

## 2024-01-09 ENCOUNTER — OFFICE VISIT (OUTPATIENT)
Dept: FAMILY MEDICINE | Facility: CLINIC | Age: 39
End: 2024-01-09
Payer: COMMERCIAL

## 2024-01-09 VITALS
OXYGEN SATURATION: 95 % | SYSTOLIC BLOOD PRESSURE: 91 MMHG | TEMPERATURE: 97.9 F | RESPIRATION RATE: 16 BRPM | HEIGHT: 62 IN | DIASTOLIC BLOOD PRESSURE: 58 MMHG | WEIGHT: 141 LBS | BODY MASS INDEX: 25.95 KG/M2 | HEART RATE: 72 BPM

## 2024-01-09 DIAGNOSIS — M25.561 PATELLOFEMORAL JOINT PAIN, RIGHT: ICD-10-CM

## 2024-01-09 DIAGNOSIS — R10.13 DYSPEPSIA: ICD-10-CM

## 2024-01-09 DIAGNOSIS — Z30.09 FAMILY PLANNING COUNSELING: ICD-10-CM

## 2024-01-09 DIAGNOSIS — Z12.4 CERVICAL CANCER SCREENING: ICD-10-CM

## 2024-01-09 DIAGNOSIS — M25.561 RIGHT KNEE PAIN, UNSPECIFIED CHRONICITY: ICD-10-CM

## 2024-01-09 DIAGNOSIS — Z00.00 ROUTINE GENERAL MEDICAL EXAMINATION AT A HEALTH CARE FACILITY: Primary | ICD-10-CM

## 2024-01-09 PROCEDURE — 90471 IMMUNIZATION ADMIN: CPT | Performed by: FAMILY MEDICINE

## 2024-01-09 PROCEDURE — 99213 OFFICE O/P EST LOW 20 MIN: CPT | Mod: 25 | Performed by: FAMILY MEDICINE

## 2024-01-09 PROCEDURE — 99395 PREV VISIT EST AGE 18-39: CPT | Mod: 25 | Performed by: FAMILY MEDICINE

## 2024-01-09 PROCEDURE — 90686 IIV4 VACC NO PRSV 0.5 ML IM: CPT | Performed by: FAMILY MEDICINE

## 2024-01-09 ASSESSMENT — ENCOUNTER SYMPTOMS
NAUSEA: 0
SHORTNESS OF BREATH: 0
COUGH: 0
HEMATURIA: 0
DYSURIA: 0
JOINT SWELLING: 0
FEVER: 0
DIZZINESS: 0
CONSTIPATION: 0
HEMATOCHEZIA: 0
SORE THROAT: 0
EYE PAIN: 0
WEAKNESS: 0
FREQUENCY: 0
ABDOMINAL PAIN: 0
CHILLS: 0
HEARTBURN: 0
PARESTHESIAS: 0
NERVOUS/ANXIOUS: 0
MYALGIAS: 1
HEADACHES: 0
DIARRHEA: 0
PALPITATIONS: 0
ARTHRALGIAS: 1

## 2024-01-09 ASSESSMENT — PAIN SCALES - GENERAL: PAINLEVEL: MILD PAIN (3)

## 2024-01-09 NOTE — PROGRESS NOTES
SUBJECTIVE:   Shayla is a 38 year old, presenting for the following:  Physical        2024     1:12 PM   Additional Questions   Roomed by Jean-Claude   Accompanied by Self         2024     1:12 PM   Patient Reported Additional Medications   Patient reports taking the following new medications None       Healthy Habits:     Getting at least 3 servings of Calcium per day:  Yes    Bi-annual eye exam:  NO    Dental care twice a year:  Yes    Sleep apnea or symptoms of sleep apnea:  None    Diet:  Regular (no restrictions)    Frequency of exercise:  1 day/week    Duration of exercise:  Less than 15 minutes    Taking medications regularly:  Not Applicable    Medication side effects:  None    Additional concerns today:  Yes                      Social History     Tobacco Use    Smoking status: Never    Smokeless tobacco: Never   Substance Use Topics    Alcohol use: No             2024     1:08 PM   Alcohol Use   Prescreen: >3 drinks/day or >7 drinks/week? No          No data to display              Reviewed orders with patient.  Reviewed health maintenance and updated orders accordingly - Yes  Labs reviewed in EPIC  BP Readings from Last 3 Encounters:   24 91/58   23 100/66   23 97/62    Wt Readings from Last 3 Encounters:   24 64 kg (141 lb)   23 62.6 kg (138 lb)   23 62.9 kg (138 lb 11.2 oz)                  Patient Active Problem List   Diagnosis    Indication for care in labor or delivery    History of  section     premature rupture of membranes in third trimester     delivery, delivered, current hospitalization    Vitamin D deficiency    Right knee pain, unspecified chronicity     Past Surgical History:   Procedure Laterality Date    APPENDECTOMY       SECTION       SECTION N/A 10/05/2019    Procedure:  SECTION;  Surgeon: Anika Goodwin MD;  Location:  [facility]    ESOPHAGOSCOPY, GASTROSCOPY, DUODENOSCOPY (EGD), COMBINED  N/A 8/3/2023    Procedure: Esophagoscopy, gastroscopy, duodenoscopy (EGD), combined;  Surgeon: Rakan Leon MD;  Location:  GI    TONSILLECTOMY         Social History     Tobacco Use    Smoking status: Never    Smokeless tobacco: Never   Substance Use Topics    Alcohol use: No     Family History   Problem Relation Age of Onset    Diabetes Father          No current outpatient medications on file.     No Known Allergies  Recent Labs   Lab Test 22  1108 11/15/21  1122   LDL 62 78   HDL 60 54   TRIG 58 61   ALT 19 17   CR 0.58 0.62   GFRESTIMATED >90 >90   POTASSIUM 4.2 3.8   TSH  --  2.41          Breast Cancer Screenin/9/2021     1:03 PM   Breast CA Risk Assessment (FHS-7)   Do you have a family history of breast, colon, or ovarian cancer? No / Unknown       click delete button to remove this line now  Patient under 40 years of age: Routine Mammogram Screening not recommended.   Pertinent mammograms are reviewed under the imaging tab.    History of abnormal Pap smear: NO - age 30-65 PAP every 5 years with negative HPV co-testing recommended      Latest Ref Rng & Units 2021     1:18 PM   PAP / HPV   PAP  Negative for Intraepithelial Lesion or Malignancy (NILM)    HPV 16 DNA Negative Negative    HPV 18 DNA Negative Negative    Other HR HPV Negative Negative      Reviewed and updated as needed this visit by clinical staff   Tobacco  Allergies  Meds              Reviewed and updated as needed this visit by Provider                   No past medical history on file.   Past Surgical History:   Procedure Laterality Date    APPENDECTOMY       SECTION       SECTION N/A 10/05/2019    Procedure:  SECTION;  Surgeon: Anika Goodwin MD;  Location:  [facility]    ESOPHAGOSCOPY, GASTROSCOPY, DUODENOSCOPY (EGD), COMBINED N/A 8/3/2023    Procedure: Esophagoscopy, gastroscopy, duodenoscopy (EGD), combined;  Surgeon: Rakan Leon MD;  Location:  GI     TONSILLECTOMY       OB History    Para Term  AB Living   2 2 1 1 0 2   SAB IAB Ectopic Multiple Live Births   0 0 0 0 2      # Outcome Date GA Lbr Augustine/2nd Weight Sex Delivery Anes PTL Lv   2  10/05/19 36w4d  2.505 kg (5 lb 8.4 oz) F CS-LTranv Spinal Y DUYEN      Complications: Failure to Progress in First Stage      Name: ANDRY KATE-ELLIS      Apgar1: 8  Apgar5: 9   1 Term 16    F -SEC   DUYEN      Complications: Face presentation of fetus       Review of Systems   Constitutional:  Negative for chills and fever.   HENT:  Negative for congestion, ear pain, hearing loss and sore throat.    Eyes:  Negative for pain and visual disturbance.   Respiratory:  Negative for cough and shortness of breath.    Cardiovascular:  Negative for chest pain, palpitations and peripheral edema.   Gastrointestinal:  Negative for abdominal pain, constipation, diarrhea, heartburn, hematochezia and nausea.   Genitourinary:  Negative for dysuria, frequency, genital sores, hematuria and urgency.   Musculoskeletal:  Positive for arthralgias and myalgias. Negative for joint swelling.   Skin:  Negative for rash.   Neurological:  Negative for dizziness, weakness, headaches and paresthesias.   Psychiatric/Behavioral:  Negative for mood changes. The patient is not nervous/anxious.      CONSTITUTIONAL: NEGATIVE for fever, chills, change in weight  INTEGUMENTARU/SKIN: NEGATIVE for worrisome rashes, moles or lesions  EYES: NEGATIVE for vision changes or irritation  ENT: NEGATIVE for ear, mouth and throat problems  RESP: NEGATIVE for significant cough or SOB  BREAST: NEGATIVE for masses, tenderness or discharge  CV: NEGATIVE for chest pain, palpitations or peripheral edema  GI: NEGATIVE for nausea, abdominal pain, heartburn, or change in bowel habits  GI: Hx GERD  : NEGATIVE for unusual urinary or vaginal symptoms. Periods are regular.  MUSCULOSKELETAL: Intermittent right knee discomfort  NEURO: NEGATIVE for  "weakness, dizziness or paresthesias  ENDOCRINE: NEGATIVE for temperature intolerance, skin/hair changes  HEME/ALLERGY/IMMUNE: NEGATIVE for bleeding problems  PSYCHIATRIC: NEGATIVE for changes in mood or affect     OBJECTIVE:   BP 91/58 (BP Location: Right arm, Patient Position: Sitting, Cuff Size: Adult Regular)   Pulse 72   Temp 97.9  F (36.6  C) (Tympanic)   Resp 16   Ht 1.575 m (5' 2\")   Wt 64 kg (141 lb)   LMP 12/21/2023 (Exact Date)   SpO2 95%   BMI 25.79 kg/m    Physical Exam  GENERAL: healthy, alert and no distress  EYES: Eyes grossly normal to inspection, PERRL and conjunctivae and sclerae normal  HENT: ear canals and TM's normal, nose and mouth without ulcers or lesions  NECK: no adenopathy, no asymmetry, masses, or scars and thyroid normal to palpation  RESP: lungs clear to auscultation - no rales, rhonchi or wheezes  BREAST: normal without masses, tenderness or nipple discharge and no palpable axillary masses or adenopathy  CV: regular rate and rhythm, normal S1 S2, no S3 or S4, no murmur, click or rub, no peripheral edema and peripheral pulses strong  ABDOMEN: soft, nontender, no hepatosplenomegaly, no masses and bowel sounds normal  MS: no gross musculoskeletal defects noted, no edema  MS: Normal right knee exam with no patellar apprehension, patellar effusion joint line tenderness,  Full range of motion  SKIN: no suspicious lesions or rashes  NEURO: Normal strength and tone, mentation intact and speech normal  PSYCH: mentation appears normal, affect normal/bright    Diagnostic Test Results:  Labs reviewed in Epic    ASSESSMENT/PLAN:   (Z00.00) Routine general medical examination at a health care facility  (primary encounter diagnosis)  Comment:   Plan: Discussed on regular exercises, healthy eating,  and routine dental checks.    (M25.332) Right knee pain, unspecified chronicity  Comment:   Plan: Likely patellofemoral joint pain  Reviewed etiology, gave education handouts on the same, start on " regular exercises for the right knee rehab   Follow-up if no improvement noted in 4 weeks or sooner if needed   apply local ice and heat, avoid triggering activities.  Patient verbalised understanding and is agreeable to the plan.        (M25.561) Patellofemoral joint pain, right  Comment:   Plan: as above      (Z12.4) Cervical cancer screening  Comment:   Plan: Patient is not due for the Pap this year    (Z30.09) Family planning counseling  Comment:   Plan: Ob/Gyn  Referral        Patient is  2 para 2, last childbirth-4 years ago, thinking of tubal ligation    (R10.13) Dyspepsia  Comment:   Plan: With occasional GERD symptoms, continue to avoid triggers, follow GERD precautions, over-the-counter Tums or Pepcid or Prilosec as needed  Patient verbalised understanding and is agreeable to the plan.            COUNSELING:  Reviewed preventive health counseling, as reflected in patient instructions  Special attention given to:        Regular exercise       Healthy diet/nutrition       Vision screening       Immunizations  Vaccinated for: Influenza and Declined: Hepatitis B due to Other -wants to have it done later             Contraception        She reports that she has never smoked. She has never used smokeless tobacco.      Chart documentation done in part with Dragon Voice recognition Software. Although reviewed after completion, some word and grammatical error may remain.    Nallely Persaud MD  [facility]

## 2024-01-09 NOTE — PROGRESS NOTES
Prior to immunization administration, verified patients identity using patient s name and date of birth. Please see Immunization Activity for additional information.     Screening Questionnaire for Adult Immunization    Are you sick today?   No   Do you have allergies to medications, food, a vaccine component or latex?   No   Have you ever had a serious reaction after receiving a vaccination?   No   Do you have a long-term health problem with heart, lung, kidney, or metabolic disease (e.g., diabetes), asthma, a blood disorder, no spleen, complement component deficiency, a cochlear implant, or a spinal fluid leak?  Are you on long-term aspirin therapy?   No   Do you have cancer, leukemia, HIV/AIDS, or any other immune system problem?   No   Do you have a parent, brother, or sister with an immune system problem?   No   In the past 3 months, have you taken medications that affect  your immune system, such as prednisone, other steroids, or anticancer drugs; drugs for the treatment of rheumatoid arthritis, Crohn s disease, or psoriasis; or have you had radiation treatments?   No   Have you had a seizure, or a brain or other nervous system problem?   No   During the past year, have you received a transfusion of blood or blood    products, or been given immune (gamma) globulin or antiviral drug?   No   For women: Are you pregnant or is there a chance you could become       pregnant during the next month?   No   Have you received any vaccinations in the past 4 weeks?   No     Immunization questionnaire answers were all negative.      Patient instructed to remain in clinic for 15 minutes afterwards, and to report any adverse reactions.     Screening performed by Danita Valentino MA on 1/9/2024 at 1:52 PM.

## 2024-07-28 NOTE — TELEPHONE ENCOUNTER
Forms completed, signed, left at completed bin for MA staff to fax      Per Dr Inman (hospitalist) can admit to med/surg

## 2024-08-27 ENCOUNTER — NURSE TRIAGE (OUTPATIENT)
Dept: FAMILY MEDICINE | Facility: CLINIC | Age: 39
End: 2024-08-27
Payer: COMMERCIAL

## 2024-08-27 NOTE — TELEPHONE ENCOUNTER
Pt calling because she has been having abdominal  pain for about 3 weeks now. States that it is under her left rib. Pain comes and goes and currently a 4/10. Pain does not radiate. States that she notices the pain comes and it is worse when she eats certain food like spicy.  Pt has has this before and states that Prilosec helped.   No vomiting, diarrhea, fever, or other symptoms.    Per protocol pt should be seen today or tomorrow. Offered visit with a provider tomorrow but pt wants to her her PCP.   Assisted in scheduling virtual visit with PCP.    Pt understands to be seen or call the clinic if symptoms worsen or change.    NICOLE Trent, RN  [facility]         Reason for Disposition   MILD pain (e.g., does not interfere with normal activities) and pain comes and goes (cramps) lasts > 48 hours  (Exception: This same abdominal pain is a chronic symptom recurrent or ongoing AND present > 4 weeks.)    Additional Information   Negative: Passed out (i.e., fainted, collapsed and was not responding)   Negative: Shock suspected (e.g., cold/pale/clammy skin, too weak to stand, low BP, rapid pulse)   Negative: Sounds like a life-threatening emergency to the triager   Negative: Followed an abdomen (stomach) injury   Negative: Chest pain   Negative: Abdominal pain and pregnant < 20 weeks   Negative: Abdominal pain and pregnant 20 or more weeks   Negative: Pain is mainly in upper abdomen (if needed ask: 'is it mainly above the belly button?')   Negative: Abdomen bloating or swelling are main symptoms   Negative: SEVERE abdominal pain (e.g., excruciating)   Negative: Vomiting red blood or black (coffee ground) material   Negative: Blood in bowel movements  (Exception: Blood on surface of BM with constipation.)   Negative: Black or tarry bowel movements  (Exception: Chronic-unchanged black-grey BMs AND is taking iron pills or Pepto-Bismol.)   Negative: MILD TO MODERATE constant pain lasting > 2  "hours   Negative: Vomiting bile (green color)   Negative: Patient sounds very sick or weak to the triager   Negative: Vomiting and abdomen looks much more swollen than usual   Negative: White of the eyes have turned yellow (i.e., jaundice)   Negative: Blood in urine (red, pink, or tea-colored)   Negative: Fever > 103 F (39.4 C)   Negative: Fever > 101 F (38.3 C) and over 60 years of age   Negative: Fever > 100.0 F (37.8 C) and has diabetes mellitus or a weak immune system (e.g., HIV positive, cancer chemotherapy, organ transplant, splenectomy, chronic steroids)   Negative: Fever > 100.0 F (37.8 C) and bedridden (e.g., CVA, chronic illness, recovering from surgery)   Negative: Pregnant or could be pregnant (i.e., missed last menstrual period)   Negative: MODERATE pain (e.g., interferes with normal activities that comes and goes (cramps) lasts > 24 hours  (Exception: Pain with Vomiting or Diarrhea - see that Protocol.)   Negative: Unusual vaginal discharge   Negative: Age > 60 years   Negative: Patient wants to be seen    Answer Assessment - Initial Assessment Questions  1. LOCATION: \"Where does it hurt?\"       Abdomen under left rib   2. RADIATION: \"Does the pain shoot anywhere else?\" (e.g., chest, back)      no  3. ONSET: \"When did the pain begin?\" (e.g., minutes, hours or days ago)       3 weeks   4. SUDDEN: \"Gradual or sudden onset?\"      gradual  5. PATTERN \"Does the pain come and go, or is it constant?\"     - If it comes and goes: \"How long does it last?\" \"Do you have pain now?\"      (Note: Comes and goes means the pain is intermittent. It goes away completely between bouts.)     - If constant: \"Is it getting better, staying the same, or getting worse?\"       (Note: Constant means the pain never goes away completely; most serious pain is constant and gets worse.)       Comes and goes.   6. SEVERITY: \"How bad is the pain?\"  (e.g., Scale 1-10; mild, moderate, or severe)     - MILD (1-3): Doesn't interfere with " "normal activities, abdomen soft and not tender to touch.      - MODERATE (4-7): Interferes with normal activities or awakens from sleep, abdomen tender to touch.      - SEVERE (8-10): Excruciating pain, doubled over, unable to do any normal activities.        4/10  7. RECURRENT SYMPTOM: \"Have you ever had this type of stomach pain before?\" If Yes, ask: \"When was the last time?\" and \"What happened that time?\"       Had it before  8. CAUSE: \"What do you think is causing the stomach pain?\"      Indigestion/heart burn  9. RELIEVING/AGGRAVATING FACTORS: \"What makes it better or worse?\" (e.g., antacids, bending or twisting motion, bowel movement)      Spicy food  10. OTHER SYMPTOMS: \"Do you have any other symptoms?\" (e.g., back pain, diarrhea, fever, urination pain, vomiting)        no  11. PREGNANCY: \"Is there any chance you are pregnant?\" \"When was your last menstrual period?\"        .    Protocols used: Abdominal Pain - Female-A-[place]    "

## 2024-09-04 ENCOUNTER — VIRTUAL VISIT (OUTPATIENT)
Dept: FAMILY MEDICINE | Facility: CLINIC | Age: 39
End: 2024-09-04
Payer: COMMERCIAL

## 2024-09-04 DIAGNOSIS — R10.12 LUQ ABDOMINAL PAIN: ICD-10-CM

## 2024-09-04 DIAGNOSIS — K30 NON-ULCER DYSPEPSIA: Primary | ICD-10-CM

## 2024-09-04 PROCEDURE — 99214 OFFICE O/P EST MOD 30 MIN: CPT | Mod: 95 | Performed by: FAMILY MEDICINE

## 2024-09-04 PROCEDURE — G2211 COMPLEX E/M VISIT ADD ON: HCPCS | Mod: 95 | Performed by: FAMILY MEDICINE

## 2024-09-04 NOTE — PROGRESS NOTES
"  If patient has telephone visit, have they been educated on video visit as preferred visit method and offered to change to video visit? NOT APPLICABLE        Instructions Relayed to Patient by Virtual Roomer:     Patient is active on [employer]:   Relayed following to patient: \"It looks like you are active on [employer], are you able to join the visit this way? If not, do you need us to send you a link now or would you like your provider to send a link via text or email when they are ready to initiate the visit?\"      Patient Confirmed they will join visit via: [employer]  Reminded patient to ensure they were logged on to virtual visit by arrival time listed.   Asked if patient has flexibility to initiate visit sooner than arrival time: patient is unable to initiate visit earlier than arrival time     If pediatric virtual visit, ensured pediatric patient along with parent/guardian will be present for video visit.     Patient offered the website [email or web address] and/or phone number to [employer] Help line: [phone]   Shayla is a 39 year old who is being evaluated via a billable video visit.    How would you like to obtain your AVS? [employer]  If the video visit is dropped, the invitation should be resent by: Text to cell phone: [phone]  Will anyone else be joining your video visit? No      Assessment & Plan     Non-ulcer dyspepsia  Reassured patient  Reviewed normal upper GI endoscopy from August 2023 and CT abdomen with no concerns that would explain her left upper quadrant abdominal pain  Recommended stress reduction  Discussed in detail on relaxation techniques  Recommended to avoid prolonged fasting, caffeinated drinks, sour, spicy and fried foods.  Recommended to start back on Prilosec 20 mg twice a day for 2 weeks followed by 1 capsule daily  If no improvement or resolution noted in 4 weeks, patient will follow-up in clinic for further evaluation  Patient verbalised understanding and is agreeable " "to the plan.    - omeprazole (PRILOSEC) 20 MG DR capsule; Take 1 capsule (20 mg) by mouth 2 times daily.    Left upper quadrant abdominal pain    -Likely from underlying dyspepsia as mentioned above  Plan-as mentioned above      BMI  Estimated body mass index is 25.79 kg/m  as calculated from the following:    Height as of 1/9/24: 1.575 m (5' 2\").    Weight as of 1/9/24: 64 kg (141 lb).         Chart documentation done in part with Dragon Voice recognition Software. Although reviewed after completion, some word and grammatical error may remain.    See Patient Instructions    Negro Mcguire is a 39 year old, presenting for the following health issues:  Patient is here for a video visit instead of in person visit due to the current COVID-19 pandemic.    Patient with past medical history significant for dyspepsia is here for video visit with concerns of having pain in the epigastric and left upper quadrant associated with abdominal bloating and heartburn that started 2 months ago  Patient was in [place] in May 2024, when she started taking all daily medications for her GI concerns, followed medications for 48 days  Patient reported having her menstrual cycle that month 1 week earlier than usual followed by significant heartburn, abdominal bloating  Patient reports that she was under stress from her child's school program  She was also drinking several drinks of coffee and tea around that time.  Denies nausea, vomiting, melena, hematemesis, NSAID use  Patient reports trying Prilosec for a couple of days and did not make any improvement in symptoms  She reports trialing coconut milk that gave her instant relief.  Patient had the upper abdominal symptoms for a few years now  Had allergy endoscopy last August in 2023 that was negative  She also had CT that showed no abdominal concerns and explain her left upper quadrant abdominal pain and dyspepsia  Patient had negative stool H. pylori test done  She reports that she " stopped drinking coffee and tea at this time  Patient does not have change in bowel movements, fever, chills, UTI symptoms  Follow Up (Stomach Pain )        9/4/2024     4:13 PM   Additional Questions   Roomed by Eunice ANDREWS   Accompanied by self     History of Present Illness       Reason for visit:  Stomach Pain   She is taking medications regularly.       Concern - Stomach pain   Onset: 2 weeks   Description: pain (sharp pain)   Intensity: moderate  Progression of Symptoms:  improving  Accompanying Signs & Symptoms: sharp pain   Previous history of similar problem: Yes   Precipitating factors:        Worsened by: None   Alleviating factors:        Improved by: Prilosec   Therapies tried and outcome: As above        Review of Systems  CONSTITUTIONAL: NEGATIVE for fever, chills, change in weight  RESP: NEGATIVE for significant cough or SOB  CV: NEGATIVE for chest pain, palpitations or peripheral edema  GI: As above  : normal menstrual cycles  MUSCULOSKELETAL: NEGATIVE for significant arthralgias or myalgia  PSYCHIATRIC: NEGATIVE for changes in mood or affect      Objective           Vitals:  No vitals were obtained today due to virtual visit.    Physical Exam   GENERAL: alert and no distress  EYES: Eyes grossly normal to inspection  RESP: No audible wheeze, cough, or visible cyanosis.    NEURO: Cranial nerves grossly intact.  Mentation and speech appropriate for age.  PSYCH: Appropriate affect, tone, and pace of words          Video-Visit Details    Type of service:  Video Visit   Originating Location (pt. Location): Home    Distant Location (provider location):  Off-site  Platform used for Video Visit: Rula  Signed Electronically by: Nallely Persaud MD

## 2025-01-13 ENCOUNTER — PATIENT OUTREACH (OUTPATIENT)
Dept: CARE COORDINATION | Facility: CLINIC | Age: 40
End: 2025-01-13
Payer: COMMERCIAL

## 2025-03-02 ENCOUNTER — MYC MEDICAL ADVICE (OUTPATIENT)
Dept: FAMILY MEDICINE | Facility: CLINIC | Age: 40
End: 2025-03-02
Payer: COMMERCIAL

## 2025-03-03 ENCOUNTER — ANCILLARY PROCEDURE (OUTPATIENT)
Dept: MRI IMAGING | Facility: CLINIC | Age: 40
End: 2025-03-03
Attending: PHYSICIAN ASSISTANT
Payer: COMMERCIAL

## 2025-03-03 ENCOUNTER — OFFICE VISIT (OUTPATIENT)
Dept: PEDIATRICS | Facility: CLINIC | Age: 40
End: 2025-03-03
Payer: COMMERCIAL

## 2025-03-03 VITALS
SYSTOLIC BLOOD PRESSURE: 105 MMHG | DIASTOLIC BLOOD PRESSURE: 73 MMHG | TEMPERATURE: 97.7 F | OXYGEN SATURATION: 100 % | HEART RATE: 65 BPM

## 2025-03-03 DIAGNOSIS — R51.9 LEFT FACIAL PAIN: ICD-10-CM

## 2025-03-03 DIAGNOSIS — R51.9 LEFT FACIAL PAIN: Primary | ICD-10-CM

## 2025-03-03 LAB
ALBUMIN SERPL BCG-MCNC: 4.5 G/DL (ref 3.5–5.2)
ALP SERPL-CCNC: 44 U/L (ref 40–150)
ALT SERPL W P-5'-P-CCNC: 17 U/L (ref 0–50)
ANION GAP SERPL CALCULATED.3IONS-SCNC: 9 MMOL/L (ref 7–15)
AST SERPL W P-5'-P-CCNC: 26 U/L (ref 0–45)
BASOPHILS # BLD AUTO: 0.1 10E3/UL (ref 0–0.2)
BASOPHILS NFR BLD AUTO: 1 %
BILIRUB SERPL-MCNC: 0.4 MG/DL
BUN SERPL-MCNC: 8.2 MG/DL (ref 6–20)
CALCIUM SERPL-MCNC: 9.3 MG/DL (ref 8.8–10.4)
CHLORIDE SERPL-SCNC: 105 MMOL/L (ref 98–107)
CREAT SERPL-MCNC: 0.63 MG/DL (ref 0.51–0.95)
CRP SERPL-MCNC: <3 MG/L
EGFRCR SERPLBLD CKD-EPI 2021: >90 ML/MIN/1.73M2
EOSINOPHIL # BLD AUTO: 0.2 10E3/UL (ref 0–0.7)
EOSINOPHIL NFR BLD AUTO: 2 %
ERYTHROCYTE [DISTWIDTH] IN BLOOD BY AUTOMATED COUNT: 12.3 % (ref 10–15)
ERYTHROCYTE [SEDIMENTATION RATE] IN BLOOD BY WESTERGREN METHOD: 4 MM/HR (ref 0–20)
GLUCOSE SERPL-MCNC: 90 MG/DL (ref 70–99)
HCO3 SERPL-SCNC: 25 MMOL/L (ref 22–29)
HCT VFR BLD AUTO: 37.9 % (ref 35–47)
HGB BLD-MCNC: 12.7 G/DL (ref 11.7–15.7)
IMM GRANULOCYTES # BLD: 0 10E3/UL
IMM GRANULOCYTES NFR BLD: 0 %
LYMPHOCYTES # BLD AUTO: 2.3 10E3/UL (ref 0.8–5.3)
LYMPHOCYTES NFR BLD AUTO: 28 %
MCH RBC QN AUTO: 30.5 PG (ref 26.5–33)
MCHC RBC AUTO-ENTMCNC: 33.5 G/DL (ref 31.5–36.5)
MCV RBC AUTO: 91 FL (ref 78–100)
MONOCYTES # BLD AUTO: 0.5 10E3/UL (ref 0–1.3)
MONOCYTES NFR BLD AUTO: 6 %
NEUTROPHILS # BLD AUTO: 5.1 10E3/UL (ref 1.6–8.3)
NEUTROPHILS NFR BLD AUTO: 62 %
NRBC # BLD AUTO: 0 10E3/UL
NRBC BLD AUTO-RTO: 0 /100
PLATELET # BLD AUTO: 220 10E3/UL (ref 150–450)
POTASSIUM SERPL-SCNC: 4.3 MMOL/L (ref 3.4–5.3)
PROT SERPL-MCNC: 7.7 G/DL (ref 6.4–8.3)
RBC # BLD AUTO: 4.16 10E6/UL (ref 3.8–5.2)
SODIUM SERPL-SCNC: 139 MMOL/L (ref 135–145)
WBC # BLD AUTO: 8.2 10E3/UL (ref 4–11)

## 2025-03-03 PROCEDURE — 85652 RBC SED RATE AUTOMATED: CPT | Performed by: PHYSICIAN ASSISTANT

## 2025-03-03 PROCEDURE — A9585 GADOBUTROL INJECTION: HCPCS | Performed by: RADIOLOGY

## 2025-03-03 PROCEDURE — 99214 OFFICE O/P EST MOD 30 MIN: CPT | Performed by: PHYSICIAN ASSISTANT

## 2025-03-03 PROCEDURE — 85025 COMPLETE CBC W/AUTO DIFF WBC: CPT | Performed by: PHYSICIAN ASSISTANT

## 2025-03-03 PROCEDURE — 70553 MRI BRAIN STEM W/O & W/DYE: CPT | Performed by: RADIOLOGY

## 2025-03-03 PROCEDURE — 3078F DIAST BP <80 MM HG: CPT | Performed by: PHYSICIAN ASSISTANT

## 2025-03-03 PROCEDURE — 93000 ELECTROCARDIOGRAM COMPLETE: CPT | Performed by: PHYSICIAN ASSISTANT

## 2025-03-03 PROCEDURE — 36415 COLL VENOUS BLD VENIPUNCTURE: CPT | Performed by: PHYSICIAN ASSISTANT

## 2025-03-03 PROCEDURE — 80053 COMPREHEN METABOLIC PANEL: CPT | Performed by: PHYSICIAN ASSISTANT

## 2025-03-03 PROCEDURE — 3074F SYST BP LT 130 MM HG: CPT | Performed by: PHYSICIAN ASSISTANT

## 2025-03-03 PROCEDURE — 86140 C-REACTIVE PROTEIN: CPT | Performed by: PHYSICIAN ASSISTANT

## 2025-03-03 RX ORDER — CARBAMAZEPINE 100 MG/1
100 TABLET, EXTENDED RELEASE ORAL 2 TIMES DAILY
Qty: 28 TABLET | Refills: 0 | Status: SHIPPED | OUTPATIENT
Start: 2025-03-03 | End: 2025-03-17

## 2025-03-03 RX ORDER — GADOBUTROL 604.72 MG/ML
6.5 INJECTION INTRAVENOUS ONCE
Status: COMPLETED | OUTPATIENT
Start: 2025-03-03 | End: 2025-03-03

## 2025-03-03 RX ADMIN — GADOBUTROL 6.5 ML: 604.72 INJECTION INTRAVENOUS at 14:51

## 2025-03-03 NOTE — TELEPHONE ENCOUNTER
Patient has not been triaged but may be appropriate for ADS. Please give to provider to review if imaging may be needed?     If patient not accepted, please route back to  [facility].     JEFF Paulino  [facility] Triage  [place]

## 2025-03-03 NOTE — PROGRESS NOTES
Acute and Diagnostic Services Clinic Visit    Assessment & Plan     (R51.9) Left facial pain  (primary encounter diagnosis)  Comment:   Plan: MR Brain w/o & w Contrast, MRA Brain ([place] [place] [facility]        [place]) w/o Contrast, MRA Neck ([place]) w/o &        w Contrast, CBC with platelets differential,         Comprehensive metabolic panel, CRP         inflammation, Erythrocyte sedimentation rate         auto, EKG 12-lead, tracing only, carBAMazepine         (TEGRETOL XR) 100 MG 12 hr tablet, Primary Care        Referral          The patient is a 39-year-old female who presents to clinic for evaluation of shooting pains in the left side of her face, developed 2 weeks ago.   Given her report of intermittent shooting pains of the left side of the face and the V3 distribution, I am concerned clinically for trigeminal neuralgia.    Differential diagnosis includes but is not limited to headaches, migraines, paresthesias, hemiplegic migraine,, CVA, intracranial hemorrhage, soft tissue infection.    However, symptoms are not consistent with migraine headaches, CVA.  No acute intracranial or vascular abnormality identified on MRI and MRA studies completed today.  No large vessel occlusion, high-grade stenosis, aneurysm or vascular malformation, no dissections.  No acute intracranial abnormality noted on MRI of the head today.  No throat pain or findings on exam consistent with peritonsillar abscess or soft tissue infection of the throat deep tissue space of the neck.  No meningeal signs on exam.    Starting low-dose Tegretol for the patient, provided a referral to primary care for further evaluation and management.  Side effects of Tegretol discussed.    Return precautions discussed including worrisome signs and symptoms that would warrant urgent medical evaluation.                  No follow-ups on file.    Negro Mcguire is a 39 year old, presenting for the following health issues:  Musculoskeletal Problem (Facial pain Left  side)    HPI      The patient is a 39-year-old female who presents to clinic for evaluation of shooting pains in the left side of her face, developed 2 weeks ago.  She has never had symptoms like this previously.  Of note, she has had a dental filling in tooth 20, was advised that she may need a root canal.  However, no dental pain today, no swelling of the gingival tissue around tooth 20.  Was prescribed doxycycline after being evaluated at [facility] clinic but did not start the medication.  She has taken ibuprofen, applied heat and ice to the left side of the face with limited relief.  No facial droop, changes in speech, numbness or tingling or weakness in the face or arms or lower extremities.    Musculoskeletal problem/pain  Onset/Duration: A couple weeks  Description:       Location: left side of face       Joint swelling: no        Redness: no        Pain: mild, moderate       Warmth: no   Progression of symptoms same  Accompanying signs and symptoms:       Fevers: no        Numbness/tingling/weakness: no   History        Trauma to the area: no         Previous history of Gout: no         Alcohol usage: no         Diuretic use: no         Recent illness: YES        Previous similar problem: no         Previous evaluation: YES- Dentist told pt that there is a filling on a tooth on the left side that needs to be repaired and could be causing the pain.  Aggravating factors include: none  Therapies tried and outcome: heat, ice, acetaminophen, and Ibuprofen no help, was given doxycycline for tooth pain and pt stated they did not take it.  Have you had any surgeries on your arteries of veins: No        Review of Systems  Constitutional, neuro, ENT, endocrine, pulmonary, cardiac, gastrointestinal, genitourinary, musculoskeletal, integument and psychiatric systems are negative, except as otherwise noted.      Objective    LMP 12/27/2024 (Exact Date)   There is no height or weight on file to calculate BMI.  Physical  Exam  Constitutional:       General: She is not in acute distress.     Appearance: Normal appearance. She is not ill-appearing, toxic-appearing or diaphoretic.   HENT:      Head: Normocephalic and atraumatic.      Right Ear: Tympanic membrane, ear canal and external ear normal. No middle ear effusion. There is no impacted cerumen. No mastoid tenderness. Tympanic membrane is not injected, perforated, erythematous, retracted or bulging.      Left Ear: Tympanic membrane, ear canal and external ear normal.  No middle ear effusion. There is no impacted cerumen. No mastoid tenderness. Tympanic membrane is not injected, perforated, erythematous, retracted or bulging.      Nose: Nose normal. No congestion or rhinorrhea.      Mouth/Throat:      Mouth: Mucous membranes are moist.      Pharynx: No oropharyngeal exudate or posterior oropharyngeal erythema.   Cardiovascular:      Rate and Rhythm: Normal rate and regular rhythm.      Pulses: Normal pulses.      Heart sounds: Normal heart sounds. No murmur heard.  Pulmonary:      Effort: Pulmonary effort is normal. No respiratory distress.      Breath sounds: Normal breath sounds. No stridor. No wheezing, rhonchi or rales.   Chest:      Chest wall: No tenderness.   Musculoskeletal:      Cervical back: Neck supple. No rigidity. No muscular tenderness.   Lymphadenopathy:      Cervical: No cervical adenopathy.      Right cervical: No superficial, deep or posterior cervical adenopathy.     Left cervical: No superficial, deep or posterior cervical adenopathy.   Neurological:      Mental Status: She is alert and oriented to person, place, and time.      Sensory: No sensory deficit.            EKG - Reviewed and interpreted by me appears normal, NSR, normal axis, normal intervals, no acute ST/T changes c/w ischemia, no LVH by voltage criteria, unchanged from previous tracings        Signed Electronically by: Carlton Wheatley PA-C

## 2025-03-03 NOTE — PATIENT INSTRUCTIONS
You were seen and evaluated today for left-sided facial pain.  History of illness and symptoms are consistent with trigeminal neuralgia.  No concerns on MRI/MRI studies today.    Starting low-dose carbamazepine, monitor for side effects, discontinue if you develop any serious reactions.    Seek urgent medical evaluation if you develop acutely worsening pain in the face, facial drooping, speech changes, fevers, weakness in the arms or legs.

## 2025-03-04 NOTE — TELEPHONE ENCOUNTER
Please help patient schedule for a video visit with me this Wednesday to review her concerns and treatment options.

## 2025-03-04 NOTE — TELEPHONE ENCOUNTER
Patient was evaluated at the [facility] yesterday due to facial pain, diagnosed with Trigeminal Neuralgia- MRI was normal.     Patient is asking if a root canal would impact Trigeminal Neuralgia. Please advise. Jarvis Linder RN, BSN

## 2025-03-26 ENCOUNTER — APPOINTMENT (OUTPATIENT)
Dept: URBAN - METROPOLITAN AREA CLINIC 257 | Age: 40
Setting detail: DERMATOLOGY
End: 2025-03-26

## 2025-03-26 DIAGNOSIS — L82.1 OTHER SEBORRHEIC KERATOSIS: ICD-10-CM

## 2025-03-26 PROCEDURE — OTHER PRESCRIPTION: OTHER

## 2025-03-26 PROCEDURE — OTHER MIPS QUALITY: OTHER

## 2025-03-26 PROCEDURE — OTHER COUNSELING: OTHER

## 2025-03-26 PROCEDURE — OTHER DEFER: OTHER

## 2025-03-26 RX ORDER — LIDOCAINE AND PRILOCAINE 2.5 %-2.5%
KIT TOPICAL
Qty: 1 | Refills: 0 | Status: ERX | COMMUNITY
Start: 2025-03-26

## 2025-03-26 ASSESSMENT — LOCATION SIMPLE DESCRIPTION DERM
LOCATION SIMPLE: RIGHT CHEEK
LOCATION SIMPLE: LEFT CHEEK
LOCATION SIMPLE: NECK

## 2025-03-26 ASSESSMENT — LOCATION ZONE DERM
LOCATION ZONE: FACE
LOCATION ZONE: NECK

## 2025-03-26 ASSESSMENT — LOCATION DETAILED DESCRIPTION DERM
LOCATION DETAILED: RIGHT CENTRAL MALAR CHEEK
LOCATION DETAILED: LEFT INFERIOR CENTRAL MALAR CHEEK
LOCATION DETAILED: RIGHT CENTRAL LATERAL NECK
LOCATION DETAILED: LEFT CENTRAL LATERAL NECK

## 2025-03-26 NOTE — HPI: SKIN LESION
What Type Of Note Output Would You Prefer (Optional)?: Standard Output
Is This A New Presentation, Or A Follow-Up?: Skin Lesions
Additional History: Patient reports some spots on her face and neck that she would like removed. She said she got them treated with a hot needle about a year ago. Patient denies any other concerns.

## 2025-03-26 NOTE — PROCEDURE: DEFER
Detail Level: Detailed
Introduction Text (Please End With A Colon): The following procedure was deferred:
X Size Of Lesion In Cm (Optional): 0
Instructions (Optional): Patient may schedule a 40 minute appointment for electrodessication.

## 2025-04-09 ENCOUNTER — APPOINTMENT (OUTPATIENT)
Dept: URBAN - METROPOLITAN AREA CLINIC 257 | Age: 40
Setting detail: DERMATOLOGY
End: 2025-04-16

## 2025-04-09 DIAGNOSIS — L82.0 INFLAMED SEBORRHEIC KERATOSIS: ICD-10-CM

## 2025-04-09 PROCEDURE — OTHER BENIGN DESTRUCTION: OTHER

## 2025-04-09 PROCEDURE — OTHER COUNSELING: OTHER

## 2025-04-09 PROCEDURE — OTHER MIPS QUALITY: OTHER

## 2025-04-09 PROCEDURE — 17111 DESTRUCT LESION 15 OR MORE: CPT

## 2025-04-09 ASSESSMENT — LOCATION DETAILED DESCRIPTION DERM
LOCATION DETAILED: LEFT INFERIOR CENTRAL MALAR CHEEK
LOCATION DETAILED: LEFT INFERIOR ANTERIOR NECK
LOCATION DETAILED: RIGHT SUPERIOR MEDIAL BUCCAL CHEEK
LOCATION DETAILED: RIGHT MEDIAL MALAR CHEEK
LOCATION DETAILED: RIGHT SUPERIOR ANTERIOR NECK
LOCATION DETAILED: RIGHT CENTRAL MALAR CHEEK
LOCATION DETAILED: LEFT CENTRAL MALAR CHEEK
LOCATION DETAILED: LEFT LATERAL MALAR CHEEK
LOCATION DETAILED: LEFT NASAL ALAR GROOVE
LOCATION DETAILED: RIGHT NASAL ALA
LOCATION DETAILED: LEFT SUPERIOR LATERAL BUCCAL CHEEK
LOCATION DETAILED: RIGHT INFERIOR CENTRAL MALAR CHEEK

## 2025-04-09 ASSESSMENT — LOCATION SIMPLE DESCRIPTION DERM
LOCATION SIMPLE: LEFT NOSE
LOCATION SIMPLE: RIGHT NOSE
LOCATION SIMPLE: RIGHT ANTERIOR NECK
LOCATION SIMPLE: LEFT ANTERIOR NECK
LOCATION SIMPLE: RIGHT CHEEK
LOCATION SIMPLE: LEFT CHEEK

## 2025-04-09 ASSESSMENT — LOCATION ZONE DERM
LOCATION ZONE: FACE
LOCATION ZONE: NECK
LOCATION ZONE: NOSE

## 2025-04-09 NOTE — PROCEDURE: BENIGN DESTRUCTION
Anesthesia Volume In Cc: 0
Include Z78.9 (Other Specified Conditions Influencing Health Status) As An Associated Diagnosis?: No
Treatment Number (Will Not Render If 0): 1
Detail Level: Detailed
Render Post-Care Instructions In Note?: yes
Topical Anesthesia?: 2.5% lidocaine, 2.5% prilocaine
Consent: The patient's consent was obtained including but not limited to risks of crusting, scabbing, blistering, scarring, darker or lighter pigmentary change, recurrence, incomplete removal and infection.
Medical Necessity Clause: This procedure was medically necessary because the lesions that were treated were:
Medical Necessity Information: It is in your best interest to select a reason for this procedure from the list below. All of these items fulfill various CMS LCD requirements except the new and changing color options.
Post-Care Instructions: I reviewed with the patient in detail post-care instructions. Patient is to wear sunprotection, and avoid picking at any of the treated lesions. Pt may apply Vaseline to crusted or scabbing areas.

## 2025-05-20 ENCOUNTER — MYC MEDICAL ADVICE (OUTPATIENT)
Dept: FAMILY MEDICINE | Facility: CLINIC | Age: 40
End: 2025-05-20
Payer: COMMERCIAL

## 2025-05-20 DIAGNOSIS — N64.4 MASTODYNIA OF LEFT BREAST: Primary | ICD-10-CM

## 2025-05-21 NOTE — TELEPHONE ENCOUNTER
Imaging order for MA Diagnostic Digital Left and US Breast Left Complete 4 Quadrants were faxed to [facility] in [place] as pt req. Pt notified via [employer]

## 2025-05-21 NOTE — TELEPHONE ENCOUNTER
Imaging ordered.  Please fax both of them to [facility] in [place] as she specifically requested and update her when done

## 2025-05-21 NOTE — TELEPHONE ENCOUNTER
Routing to provider, please advise. Patient is requesting orders for mammogram and breast ultrasound go to [facility]. Jarvis Linder RN, BSN, PHN

## (undated) DEVICE — SUCTION CANISTER MEDIVAC LINER 3000ML W/LID 65651-530

## (undated) DEVICE — GLOVE PROTEXIS MICRO 6.5  2D73PM65

## (undated) DEVICE — CATH TRAY FOLEY 16FR BARDEX W/DRAIN BAG STATLOCK 300316A

## (undated) DEVICE — GLOVE PROTEXIS BLUE W/NEU-THERA 7.0  2D73EB70

## (undated) DEVICE — SU MONOCRYL 0 CT-1 36" UND Y946H

## (undated) DEVICE — SU MONOCRYL 0 CTX 36" Y398H

## (undated) DEVICE — BLADE CLIPPER 4406

## (undated) DEVICE — ESU GROUND PAD UNIVERSAL W/O CORD

## (undated) DEVICE — PREP CHLORAPREP 26ML TINTED ORANGE  260815

## (undated) DEVICE — DRSG GAUZE 4X4" TOPPER

## (undated) DEVICE — DRSG TELFA 3X8" 1238

## (undated) DEVICE — DRSG TEGADERM 4X10" 1627

## (undated) DEVICE — DRSG STERI STRIP 1/2X4" R1547

## (undated) DEVICE — LINEN C-SECTION 5415

## (undated) DEVICE — SU PLAIN 3-0 CT 27" 852H

## (undated) DEVICE — SOL NACL 0.9% IRRIG 1000ML BOTTLE 07138-09

## (undated) DEVICE — PACK C-SECTION LF PL15OTA83B

## (undated) DEVICE — SU VICRYL 0 CT-1 27" J340H

## (undated) RX ORDER — FENTANYL CITRATE 50 UG/ML
INJECTION, SOLUTION INTRAMUSCULAR; INTRAVENOUS
Status: DISPENSED
Start: 2023-08-03